# Patient Record
Sex: FEMALE | Race: WHITE | Employment: UNEMPLOYED | URBAN - METROPOLITAN AREA
[De-identification: names, ages, dates, MRNs, and addresses within clinical notes are randomized per-mention and may not be internally consistent; named-entity substitution may affect disease eponyms.]

---

## 2017-01-02 ENCOUNTER — APPOINTMENT (OUTPATIENT)
Dept: GENERAL RADIOLOGY | Age: 32
End: 2017-01-02
Attending: EMERGENCY MEDICINE
Payer: OTHER GOVERNMENT

## 2017-01-02 ENCOUNTER — HOSPITAL ENCOUNTER (EMERGENCY)
Age: 32
Discharge: HOME | End: 2017-01-02
Attending: EMERGENCY MEDICINE
Payer: OTHER GOVERNMENT

## 2017-01-02 VITALS
WEIGHT: 145 LBS | BODY MASS INDEX: 25.69 KG/M2 | HEART RATE: 88 BPM | TEMPERATURE: 97.9 F | HEIGHT: 63 IN | DIASTOLIC BLOOD PRESSURE: 62 MMHG | RESPIRATION RATE: 14 BRPM | SYSTOLIC BLOOD PRESSURE: 106 MMHG | OXYGEN SATURATION: 100 %

## 2017-01-02 DIAGNOSIS — R52 BODY ACHES: ICD-10-CM

## 2017-01-02 DIAGNOSIS — M94.0 COSTOCHONDRITIS: Primary | ICD-10-CM

## 2017-01-02 DIAGNOSIS — J06.9 ACUTE UPPER RESPIRATORY INFECTION: ICD-10-CM

## 2017-01-02 LAB
ANION GAP BLD CALC-SCNC: 7 MMOL/L (ref 3–18)
APPEARANCE UR: NORMAL
BASOPHILS # BLD AUTO: 0 K/UL (ref 0–0.06)
BASOPHILS # BLD: 0 % (ref 0–2)
BILIRUB UR QL: NEGATIVE
BUN SERPL-MCNC: 13 MG/DL (ref 7–18)
BUN/CREAT SERPL: 18 (ref 12–20)
CALCIUM SERPL-MCNC: 9 MG/DL (ref 8.5–10.1)
CHLORIDE SERPL-SCNC: 109 MMOL/L (ref 100–108)
CO2 SERPL-SCNC: 23 MMOL/L (ref 21–32)
COLOR UR: YELLOW
CREAT SERPL-MCNC: 0.74 MG/DL (ref 0.6–1.3)
D DIMER PPP FEU-MCNC: 0.31 UG/ML(FEU)
DIFFERENTIAL METHOD BLD: ABNORMAL
EOSINOPHIL # BLD: 0.2 K/UL (ref 0–0.4)
EOSINOPHIL NFR BLD: 1 % (ref 0–5)
ERYTHROCYTE [DISTWIDTH] IN BLOOD BY AUTOMATED COUNT: 12.6 % (ref 11.6–14.5)
GLUCOSE SERPL-MCNC: 81 MG/DL (ref 74–99)
GLUCOSE UR STRIP.AUTO-MCNC: NEGATIVE MG/DL
HCG UR QL: NEGATIVE
HCT VFR BLD AUTO: 38.4 % (ref 35–45)
HGB BLD-MCNC: 13 G/DL (ref 12–16)
HGB UR QL STRIP: NEGATIVE
KETONES UR QL STRIP.AUTO: NEGATIVE MG/DL
LEUKOCYTE ESTERASE UR QL STRIP.AUTO: NEGATIVE
LYMPHOCYTES # BLD AUTO: 12 % (ref 21–52)
LYMPHOCYTES # BLD: 1.7 K/UL (ref 0.9–3.6)
MAGNESIUM SERPL-MCNC: 2 MG/DL (ref 1.8–2.4)
MCH RBC QN AUTO: 30 PG (ref 24–34)
MCHC RBC AUTO-ENTMCNC: 33.9 G/DL (ref 31–37)
MCV RBC AUTO: 88.5 FL (ref 74–97)
MONOCYTES # BLD: 0.7 K/UL (ref 0.05–1.2)
MONOCYTES NFR BLD AUTO: 5 % (ref 3–10)
NEUTS SEG # BLD: 12 K/UL (ref 1.8–8)
NEUTS SEG NFR BLD AUTO: 82 % (ref 40–73)
NITRITE UR QL STRIP.AUTO: NEGATIVE
PH UR STRIP: 6.5 [PH] (ref 5–8)
PLATELET # BLD AUTO: 314 K/UL (ref 135–420)
PMV BLD AUTO: 9.5 FL (ref 9.2–11.8)
POTASSIUM SERPL-SCNC: 4.6 MMOL/L (ref 3.5–5.5)
PROT UR STRIP-MCNC: NEGATIVE MG/DL
RBC # BLD AUTO: 4.34 M/UL (ref 4.2–5.3)
SODIUM SERPL-SCNC: 139 MMOL/L (ref 136–145)
SP GR UR REFRACTOMETRY: 1 (ref 1–1.03)
TROPONIN I SERPL-MCNC: <0.02 NG/ML (ref 0–0.04)
UROBILINOGEN UR QL STRIP.AUTO: 0.2 EU/DL (ref 0.2–1)
WBC # BLD AUTO: 14.6 K/UL (ref 4.6–13.2)

## 2017-01-02 PROCEDURE — 83735 ASSAY OF MAGNESIUM: CPT

## 2017-01-02 PROCEDURE — 99284 EMERGENCY DEPT VISIT MOD MDM: CPT

## 2017-01-02 PROCEDURE — 74011250637 HC RX REV CODE- 250/637: Performed by: PHYSICIAN ASSISTANT

## 2017-01-02 PROCEDURE — 96374 THER/PROPH/DIAG INJ IV PUSH: CPT

## 2017-01-02 PROCEDURE — 85025 COMPLETE CBC W/AUTO DIFF WBC: CPT

## 2017-01-02 PROCEDURE — 80048 BASIC METABOLIC PNL TOTAL CA: CPT

## 2017-01-02 PROCEDURE — 96375 TX/PRO/DX INJ NEW DRUG ADDON: CPT

## 2017-01-02 PROCEDURE — 74011250636 HC RX REV CODE- 250/636: Performed by: PHYSICIAN ASSISTANT

## 2017-01-02 PROCEDURE — 81003 URINALYSIS AUTO W/O SCOPE: CPT

## 2017-01-02 PROCEDURE — 84484 ASSAY OF TROPONIN QUANT: CPT

## 2017-01-02 PROCEDURE — 85379 FIBRIN DEGRADATION QUANT: CPT

## 2017-01-02 PROCEDURE — 81025 URINE PREGNANCY TEST: CPT

## 2017-01-02 PROCEDURE — 71020 XR CHEST PA LAT: CPT

## 2017-01-02 PROCEDURE — 93005 ELECTROCARDIOGRAM TRACING: CPT

## 2017-01-02 RX ORDER — MORPHINE SULFATE 2 MG/ML
2 INJECTION, SOLUTION INTRAMUSCULAR; INTRAVENOUS ONCE
Status: COMPLETED | OUTPATIENT
Start: 2017-01-02 | End: 2017-01-02

## 2017-01-02 RX ORDER — TRAMADOL HYDROCHLORIDE 50 MG/1
50 TABLET ORAL
Qty: 12 TAB | Refills: 0 | Status: SHIPPED | OUTPATIENT
Start: 2017-01-02 | End: 2017-01-13

## 2017-01-02 RX ORDER — KETOROLAC TROMETHAMINE 30 MG/ML
30 INJECTION, SOLUTION INTRAMUSCULAR; INTRAVENOUS
Status: COMPLETED | OUTPATIENT
Start: 2017-01-02 | End: 2017-01-02

## 2017-01-02 RX ADMIN — PHENOBARBITAL 35 ML: 16.2; .1037; .0065; .0194 ELIXIR ORAL at 14:57

## 2017-01-02 RX ADMIN — MORPHINE SULFATE 2 MG: 2 INJECTION, SOLUTION INTRAMUSCULAR; INTRAVENOUS at 15:50

## 2017-01-02 RX ADMIN — SODIUM CHLORIDE 1000 ML: 900 INJECTION, SOLUTION INTRAVENOUS at 14:56

## 2017-01-02 RX ADMIN — KETOROLAC TROMETHAMINE 30 MG: 30 INJECTION, SOLUTION INTRAMUSCULAR at 14:57

## 2017-01-02 NOTE — ED NOTES
Purposeful rounding completed:    Side rails up x 1:  YES   Bed low and wheels and locked: YES   Call bell in reach: YES   Comfort addressed: YES     Toileting needs addressed: YES   Plan of care reviewed/updated with patient and or family members: YES   IV site assessed: YES  Pain assessed and addressed: [facility]

## 2017-01-02 NOTE — ED PROVIDER NOTES
Patient is a 36 y.o. female presenting with general illness and chest pain. The history is provided by the patient. Generalized Body Aches   This is a new problem. The current episode started 2 days ago. The problem occurs constantly. The problem has been gradually worsening. Associated symptoms include chest pain and shortness of breath. Pertinent negatives include no abdominal pain and no headaches. Nothing aggravates the symptoms. Nothing relieves the symptoms. She has tried acetaminophen for the symptoms. The treatment provided no relief. Chest Pain (Angina)    This is a new problem. The current episode started 2 days ago. The problem has not changed since onset. The problem occurs constantly. The pain is associated with breathing. The pain is present in the substernal region. The pain is at a severity of 7/10. The quality of the pain is described as pressure-like. The pain does not radiate. The symptoms are aggravated by certain positions and deep breathing. Associated symptoms include cough, shortness of breath and weakness. Pertinent negatives include no abdominal pain, no back pain, no claudication, no diaphoresis, no dizziness, no exertional chest pressure, no fever, no headaches, no hemoptysis, no irregular heartbeat, no leg pain, no lower extremity edema, no malaise/fatigue, no nausea, no near-syncope, no numbness, no orthopnea, no palpitations, no PND, no sputum production and no vomiting. She has tried OTC pain medications for the symptoms. The treatment provided no relief. Risk factors include smoking/tobacco exposure. Her past medical history does not include cancer, DM, DVT, HTN, PE or CHF. Past Medical History:   Diagnosis Date    DJD (degenerative joint disease)     Ill-defined condition      DDD       Past Surgical History:   Procedure Laterality Date    Hx hysterectomy           History reviewed. No pertinent family history.     Social History     Social History    Marital status:      Spouse name: N/A    Number of children: N/A    Years of education: N/A     Occupational History    Not on file. Social History Main Topics    Smoking status: Current Every Day Smoker     Packs/day: 1.00    Smokeless tobacco: Not on file    Alcohol use Yes      Comment: socially    Drug use: No    Sexual activity: Yes     Partners: Male     Other Topics Concern    Not on file     Social History Narrative         ALLERGIES: Chantix [varenicline]; Neurontin [gabapentin]; and Topamax [topiramate]    Review of Systems   Constitutional: Negative for chills, diaphoresis, fever and malaise/fatigue. HENT: Negative for ear pain, rhinorrhea and sore throat. Eyes: Negative for pain and redness. Respiratory: Positive for cough and shortness of breath. Negative for hemoptysis, sputum production, wheezing and stridor. Cardiovascular: Positive for chest pain. Negative for palpitations, orthopnea, claudication, leg swelling, PND and near-syncope. Gastrointestinal: Negative for abdominal pain, constipation, diarrhea, nausea and vomiting. Genitourinary: Negative for dysuria. Musculoskeletal: Positive for myalgias. Negative for arthralgias and back pain. Skin: Negative. Neurological: Positive for weakness. Negative for dizziness, light-headedness, numbness and headaches. Psychiatric/Behavioral: Negative. Vitals:    01/02/17 1251   BP: 124/87   Pulse: 88   Resp: 14   Temp: 97.9 °F (36.6 °C)   SpO2: 99%   Weight: 65.8 kg (145 lb)   Height: 5' 3\" (1.6 m)            Physical Exam   Constitutional: She is oriented to person, place, and time. She appears well-developed and well-nourished. No distress. HENT:   Head: Normocephalic and atraumatic.    Right Ear: Tympanic membrane, external ear and ear canal normal.   Left Ear: Tympanic membrane, external ear and ear canal normal.   Nose: Nose normal.   Mouth/Throat: Oropharynx is clear and moist and mucous membranes are normal. No oropharyngeal exudate. Eyes: Conjunctivae and EOM are normal. Pupils are equal, round, and reactive to light. Neck: Normal range of motion. Cardiovascular: Normal rate, regular rhythm, normal heart sounds and intact distal pulses. Exam reveals no gallop and no friction rub. No murmur heard. Pulmonary/Chest: Effort normal and breath sounds normal. No respiratory distress. She has no wheezes. She has no rales. She exhibits tenderness. Abdominal: Soft. Bowel sounds are normal. She exhibits no distension. There is no tenderness. There is no rebound and no guarding. Musculoskeletal: Normal range of motion. She exhibits no edema. Lymphadenopathy:     She has no cervical adenopathy. Neurological: She is alert and oriented to person, place, and time. She displays normal reflexes. No cranial nerve deficit. She exhibits normal muscle tone. Coordination normal.   Skin: Skin is warm. No rash noted. She is not diaphoretic. Psychiatric: She has a normal mood and affect. Her behavior is normal. Judgment and thought content normal.   Nursing note and vitals reviewed. MDM  Number of Diagnoses or Management Options  Acute upper respiratory infection: new and requires workup  Body aches: new and requires workup  Costochondritis: new and requires workup  Diagnosis management comments: DDx:  viral vs bacterial URI, asthma exacerbation, COPD, bronchitis, PNE, PE, allergies, pneumothorax, atypical CP, costochondritis/chest wall pain, HF, MI, TAA/AAA, pericarditis, trauma (cardiac contusion, rib Fx, etc), pleuritic chest pain, pleural effusion, intraabdominal process    EKG NSR without any abnormalities, reviewed CXR myself and with Dr. Maria Ines Arana - no evidence of acute cardiopulmonary process. Demario Amaya PA-C 3:03 PM     UA and electrolytes unremarkable. Pain improved.     Reassessed patient - pain did not improve with GI cocktail -reflux unlikely source of discomfort, also reports to pain improvement with the toradol. Will give one dose of morphine. Homero Joy PA-C 3:22 PM     IMPRESSION AND MEDICAL DECISION MAKING:  Based upon the patients presentation with noted HPI and PE, along with the work up done in the emergency department, I believe that the patient is having non-cardiac chest pain as noted, along with body aches, likely due to URI. Pt non-toxic appearing and stable for discharge to home with symptomatic treatment and close follow up. DIAGNOSIS:  1. Chest wall pain  2. URI  3. Body aches    SPECIFIC PATIENT INSTRUCTIONS FROM THE EMERGENCY PHYSICIAN WHO TREATED YOU TODAY:  1. Continue taking your robaxin and celebrex as prescribed. 2. Take tramadol for pain not controlled with these. 3. Follow up with your primary doctor or your cardiologist (if you have one) in the next 2-4 days for reevaluation. 4. Return to the ED should condition change, worsen, or for any concerns. Pt results have been reviewed with them. They have been counseled regarding diagnosis, treatment, and plan. Pt verbally conveys understanding and agreement of the signs, symptoms, diagnosis, treatment and prognosis and additionally agrees to follow up as discussed. Pt also agrees with the care-plan and conveys that all of their questions have been answered. I have also provided discharge instructions for them that include: educational information regarding their diagnosis and treatment, and list of reasons why they would want to return to the ED prior to their follow-up appointment, should their condition change.    Homero Joy PA-C 3:55 PM        Amount and/or Complexity of Data Reviewed  Clinical lab tests: ordered and reviewed  Tests in the radiology section of CPT®: reviewed and ordered  Tests in the medicine section of CPT®: ordered and reviewed  Discussion of test results with the performing providers: yes  Decide to obtain previous medical records or to obtain history from someone other than the patient: yes  Obtain history from someone other than the patient: yes  Review and summarize past medical records: yes  Discuss the patient with other providers: yes  Independent visualization of images, tracings, or specimens: yes    Risk of Complications, Morbidity, and/or Mortality  Presenting problems: low  Diagnostic procedures: moderate  Management options: low    Patient Progress  Patient progress: stable    ED Course       EKG  Date/Time: 1/2/2017 1:32 PM  Performed by: Chris Banda Authorized by: Chris Banda     ECG reviewed by ED Physician in the absence of a cardiologist: yes    Previous ECG:     Previous ECG:  Unavailable  Interpretation:     Interpretation: normal    Rate:     ECG rate:  75    ECG rate assessment: normal    Rhythm:     Rhythm: sinus rhythm    Ectopy:     Ectopy: none    QRS:     QRS axis:  Normal    QRS intervals:  Normal  Conduction:     Conduction: normal    ST segments:     ST segments:  Normal  T waves:     T waves: normal        Labs Reviewed   CBC WITH AUTOMATED DIFF - Abnormal; Notable for the following:        Result Value    WBC 14.6 (*)     NEUTROPHILS 82 (*)     LYMPHOCYTES 12 (*)     ABS. NEUTROPHILS 12.0 (*)     All other components within normal limits   METABOLIC PANEL, BASIC - Abnormal; Notable for the following:     Chloride 109 (*)     All other components within normal limits   URINALYSIS W/ RFLX MICROSCOPIC   MAGNESIUM   TROPONIN I   HCG URINE, QL   D DIMER     Diagnosis:   1. Costochondritis    2. Body aches    3.  Acute upper respiratory infection          Disposition: home    Follow-up Information     Follow up With Details Comments Contact Info    [facility] EMERGENCY DEPT  As needed, If symptoms worsen [address] [place] [address] Anthony Crespo, DO In 3 days  [address] [place]ber [facility]  [phone]            Patient's Medications   Start Taking    TRAMADOL (ULTRAM) 50 MG TABLET    Take 1 Tab by mouth every six (6) hours as needed for Pain. Max Daily Amount: 200 mg. Continue Taking    CELECOXIB (CELEBREX) 200 MG CAPSULE    Take 200 mg by mouth two (2) times a day. DULOXETINE (CYMBALTA) 20 MG CAPSULE    Take 20 mg by mouth daily. METHOCARBAMOL (ROBAXIN) 500 MG TABLET    Take  by mouth four (4) times daily. These Medications have changed    No medications on file   Stop Taking    ACETAMINOPHEN-CODEINE (TYLENOL-CODEINE #3) 300-30 MG PER TABLET    Take 1-2 tabs po every 4-6 hours prn pain    PHENOL THROAT SPRAY (CHLORASEPTIC THROAT SPRAY) 1.4 % SPRAY    Take 2 Sprays by mouth every three (3) hours as needed for Sore throat. RIZATRIPTAN (MAXALT) 10 MG TABLET    Take 10 mg by mouth once as needed for Migraine. May repeat in 2 hours if needed    ZOLPIDEM (AMBIEN) 5 MG TABLET    Take 1 Tab by mouth nightly as needed for Sleep. Max Daily Amount: 5 mg.

## 2017-01-02 NOTE — ED TRIAGE NOTES
Generalized body aches, chest pain \"feels like someone is sitting on me\" chest hurts when coughing

## 2017-01-02 NOTE — ED NOTES
Patient armband removed and shredded I have reviewed discharge instructions with the patient. The patient verbalized understanding.

## 2017-01-02 NOTE — ED NOTES
Purposeful rounding completed:    Side rails up x 1:  YES   Bed low and wheels and locked: YES   Call bell in reach: YES   Comfort addressed: YES     Toileting needs addressed: YES   Plan of care reviewed/updated with patient and or family members: YES   IV site assessed: YES  Pain assessed and addressed: Kay Spann

## 2017-01-02 NOTE — ED NOTES
Purposeful rounding completed:    Side rails up x 1:  YES   Bed low and wheels and locked: YES   Call bell in reach: YES   Comfort addressed: YES     Toileting needs addressed: YES   Plan of care reviewed/updated with patient and or family members: YES   IV site assessed: YES  Pain assessed and addressed: [facility]

## 2017-01-04 ENCOUNTER — APPOINTMENT (OUTPATIENT)
Dept: PHYSICAL THERAPY | Age: 32
End: 2017-01-04
Payer: OTHER GOVERNMENT

## 2017-01-04 LAB
ATRIAL RATE: 75 BPM
CALCULATED P AXIS, ECG09: 67 DEGREES
CALCULATED R AXIS, ECG10: 46 DEGREES
CALCULATED T AXIS, ECG11: 24 DEGREES
DIAGNOSIS, 93000: NORMAL
P-R INTERVAL, ECG05: 160 MS
Q-T INTERVAL, ECG07: 342 MS
QRS DURATION, ECG06: 80 MS
QTC CALCULATION (BEZET), ECG08: 381 MS
VENTRICULAR RATE, ECG03: 75 BPM

## 2017-01-06 ENCOUNTER — HOSPITAL ENCOUNTER (OUTPATIENT)
Dept: PHYSICAL THERAPY | Age: 32
Discharge: HOME | End: 2017-01-06
Payer: OTHER GOVERNMENT

## 2017-01-06 PROCEDURE — 97140 MANUAL THERAPY 1/> REGIONS: CPT

## 2017-01-06 PROCEDURE — 97014 ELECTRIC STIMULATION THERAPY: CPT

## 2017-01-06 PROCEDURE — 97112 NEUROMUSCULAR REEDUCATION: CPT

## 2017-01-06 NOTE — PROGRESS NOTES
PELVIC FLOOR DAILY TREATMENT NOTE 8-    Patient Name: Abi Gutiérrez  Date:2017  : 1985  [x]  Patient  Verified  Payor:  / Plan: [facility]  ACTIVE DUTY AND DEPENDENTS / Product Type:  /    In time:10:08  Out time:11:08  Total Treatment Time (min): 60    Visit #: 7 of 16    Treatment Area: Pelvic pain [R10.2]    SUBJECTIVE  Pain Level (0-10 scale): 7/10  Any medication changes, allergies to medications, adverse drug reactions, diagnosis change, or new procedure performed?: [] No    [x] Yes (see summary sheet for update)  Subjective functional status/changes:   [] No changes reported  Has strep in bloodstream.  On antibiotic for 48 hrs. HEP 3x day until got sick. Whole body aches. Constipated and sick from antibiotic. Nocturia only once last night. OBJECTIVE  Modality rationale: decrease pain, increase tissue extensibility and neuromuscular reeducation to improve the patients ability to perform household chores and to engage in sex.    Min Type Additional Details   30 [x] Biofeedback x 30 minutes    supine vaginal and with add   15 [x] Estim: []Att   [x]Unatt        []TENS instruct                  [x]IFC  []Premod   []NMES                     []Other:  []w/US   []w/ice   [x]w/heat  Position: supine  Location: lower abdomen    []  Traction: [] Cervical       []Lumbar                       [] Prone          []Supine                       []Intermittent   []Continuous Lbs:  [] before manual  [] after manual    []  Ultrasound: []Continuous   [] Pulsed                           []1MHz   []3MHz Location:  W/cm2:    []  Iontophoresis with dexamethasone         Location: [] Take home patch   [] In clinic    []  Ice     []  heat  []  Ice massage Position:  Location:    []  Vasopneumatic Device Pressure:       [] lo [] med [] hi   Temperature: [] lo [] med [] hi   [x] Skin assessment post-treatment:  [x]intact []redness- no adverse reaction       []redness - adverse reaction: 15 min Manual Therapy: Release/stroking to B obturator internus and L illiococcygeus mm with vaginal stretching. Rationale: Increase tissue extensibility to improve patient's ability to engage in sexual intercourse and undergo a gynecological exam.       min Patient Education: [x] Review HEP    [] Progressed/Changed HEP based on: PF exercises with hip add  [] positioning   [] body mechanics   [] transfers   [] heat/ice application        Other Objective/Functional Measures:    [x]baseline resting tone: 11   [x]slow twitch mms 17.5(7.29) with add   [x]fast twitch mms13.9(2.49)with add    Pain Level (0-10 scale) post treatment: 4    ASSESSMENT/Changes in Function: Patient demonstrates improved strength of pelvic floor muscles with  Accessory muscle use. .      Patient will continue to benefit from skilled PT services to modify and progress therapeutic interventions, address functional mobility deficits, address strength deficits, analyze and address soft tissue restrictions, instruct in home and community integration and address dyspareunia and urinary symptoms to attain remaining goals.      []  See Plan of Care  []  See progress note/recertification  []  See Discharge Summary         Progress towards goals / Updated goals:  STG #1 met    PLAN  [x]  Upgrade activities as tolerated     []  Continue plan of care  []  Update interventions per flow sheet       []  Discharge due to:_  []  Other:_      Mahin Louis PT 1/6/2017  10:12 AM

## 2017-01-06 NOTE — PROGRESS NOTES
[facility] [address], [address], [address] - Phone: [phone]  Fax: [phone]  PROGRESS NOTE  Patient Name: Bri Solo : 1985   Treatment/Medical Diagnosis: Pelvic pain [R10.2]   Referral Source: Dontrell Lyons DO     Date of Initial Visit: 2016 Attended Visits: 7 Missed Visits: 0   SUMMARY OF TREATMENT  PT has consisted of pelvic floor relaxation/strengthening via biofeedback, education as to  pelvic floor anatomy and function/toileting techniques , moist heat, manual therapy and home exercise program.   CURRENT STATUS  Patient has made good progress in PT with short term goals partially met. Functional progress  Includes patient reporting 25% improvement in lower abdominal and vaginal pain with decreased nocturia and   straining for urination and bowel movements. Patient demonstrates improved but still impaired pelvic floor muscle strength. Goal/Measure of Progress Goal Met? 1. Patient performing pelvic floor exercises 3x day   Status at last Eval: na Current Status: 3x yes   2. Patient will decrease score on FOTO/PFDI pain to 47 to indicate improved quality of life. Status at last Eval: 54 Current Status: 25 yes   3. Decrease resting tone of pelvic floor to 6 microvolts to increase ability to engage in sex. 4. Increase net rise of fast twitch contraction to 4 microvolts to  Facilitate voiding. Status at last Eval: 8.0  1.53 Current Status: 9.6   3.6 with accessory muscle use No  progressing   New Goals to be achieved in __4__  weeks:  1. Patient independent in home exercise program.   2.  Patient will report 50% improvement in lower abdominal and vaginal pain with ADLs   3. Increase net rise of fast twitch contraction to 5 microvolts to facilitate decreased straining with urination and bowel movements. .  4.  Patient will report 25% improvement in dyspareunia.      G-Codes: na  RECOMMENDATIONS  Continue pelvic floor PT 2x week for 4 weeks. Recommend home neuromuscular stimulation unit for pelvic floor due to patient slow to strengthen. If agreed please sign and indicate below. If you have any questions/comments please contact us directly at [phone]. Thank you for allowing us to assist in the care of your patient. Therapist Signature: Suzi Prescott PT Date: 1/10/2017     Time: 8:12 AM   NOTE TO PHYSICIAN:  PLEASE COMPLETE THE ORDERS BELOW AND FAX TO   [facility]: [phone]. If you are unable to process this request in 24 hours please contact our office: [phone].    ___ I have read the above report and request that my patient continue as recommended.   ___ I have read the above report and request that my patient continue therapy with the following changes/special instructions:_________________________________________________________   ___ I have read the above report and request that my patient be discharged from therapy.      Physician Signature:        Date:       Time:

## 2017-01-10 ENCOUNTER — HOSPITAL ENCOUNTER (OUTPATIENT)
Dept: PHYSICAL THERAPY | Age: 32
Discharge: HOME | End: 2017-01-10
Payer: OTHER GOVERNMENT

## 2017-01-10 PROCEDURE — 97140 MANUAL THERAPY 1/> REGIONS: CPT

## 2017-01-10 PROCEDURE — 97112 NEUROMUSCULAR REEDUCATION: CPT

## 2017-01-10 PROCEDURE — 97014 ELECTRIC STIMULATION THERAPY: CPT

## 2017-01-10 NOTE — PROGRESS NOTES
PELVIC FLOOR DAILY TREATMENT NOTE 8-    Patient Name: Anup Donnelly  Date:1/10/2017  : 1985  [x]  Patient  Verified  Payor:  / Plan: [facility]  ACTIVE DUTY AND DEPENDENTS / Product Type:  /    In time:10:03  Out time:11:28  Total Treatment Time (min): 85    Visit #: 8 of 16    Treatment Area: Pelvic pain [R10.2]    SUBJECTIVE  Pain Level (0-10 scale): 6  Any medication changes, allergies to medications, adverse drug reactions, diagnosis change, or new procedure performed?: [x] No    [] Yes (see summary sheet for update)  Subjective functional status/changes:   [] No changes reported  Patient reports that she only did HEP 2x since last seen due to daughter having scarlet fever. Prior to this weekend 3x day. Pain is less constant but pressure is always there, 25% improved. Has not engaged in sex. \"The pushing when I have to go is seriously better and I only got up once last night\". Average is 2x night.        OBJECTIVE  Modality rationale: decrease pain and neuromuscular reeducation to improve the patients ability to perform household chores   Min Type Additional Details   30 [x] Biofeedback x 30 minutes    supine vaginal   10 [x] Estim: []Att   [x]Unatt        []TENS instruct                  []IFC  []Premod   [x]NMES  50 Hz 5/10                   []Other:  []w/US   []w/ice   []w/heat  Position: supine  Location: vaginal    []  Traction: [] Cervical       []Lumbar                       [] Prone          []Supine                       []Intermittent   []Continuous Lbs:  [] before manual  [] after manual    []  Ultrasound: []Continuous   [] Pulsed                           []1MHz   []3MHz Location:  W/cm2:    []  Iontophoresis with dexamethasone         Location: [] Take home patch   [] In clinic   15 []  Ice     [x]  heat  []  Ice massage Position: supine  Location: lower abdomen    []  Vasopneumatic Device Pressure:       [] lo [] med [] hi   Temperature: [] lo [] med [] hi [x] Skin assessment post-treatment:  [x]intact []redness- no adverse reaction       []redness - adverse reaction:       30 min Manual Therapy:  Release/stroking to R obturator internus, R illiococcygeus and L pubococcygeus muscles with vaginal stretching   Rationale:  Increase tissue extensibility to improve patient's ability to engage in sexual intercourse and undergo a gynecological exam.            min Patient Education: [x] Review HEP    [] Progressed/Changed HEP based on: Discussed NMES unit for home use. Pt agrees. [] positioning   [] body mechanics   [] transfers   [] heat/ice application        Other Objective/Functional Measures: FOTO pfdi pain decreased to 25. Added NMES to pelvic floor muscles due to patient slow to strengthen. [x]baseline resting tone: 9.6   [x]slow twitch mms 13.1(2.91) with add   [x]fast twitch mms13.5(3.6) with add    Pain Level (0-10 scale) post treatment: 3    ASSESSMENT/Changes in Function: Patient demonstrates improved strength of pelvic floor muscles with accessory muscle use and decreased pain, nocturia and straining. FOTO score indicates improved QOL. Patient will continue to benefit from skilled PT services to modify and progress therapeutic interventions, address functional mobility deficits, address strength deficits, analyze and address soft tissue restrictions, instruct in home and community integration and address dyspareunia and urinary symptoms to attain remaining goals. []  See Plan of Care  [x]  See progress note/recertification  []  See Discharge Summary         Progress towards goals / Updated goals:  STGs 1 and 2 met, #4 progressing, #3 not met.   PLAN  [x]  Upgrade activities as tolerated     []  Continue plan of care  []  Update interventions per flow sheet       []  Discharge due to:_  []  Other:_      Kiera Segovia, PT 1/10/2017  10:04 AM

## 2017-01-13 ENCOUNTER — HOSPITAL ENCOUNTER (EMERGENCY)
Age: 32
Discharge: HOME | End: 2017-01-13
Attending: EMERGENCY MEDICINE
Payer: OTHER GOVERNMENT

## 2017-01-13 ENCOUNTER — HOSPITAL ENCOUNTER (OUTPATIENT)
Dept: PHYSICAL THERAPY | Age: 32
End: 2017-01-13
Payer: OTHER GOVERNMENT

## 2017-01-13 VITALS
HEART RATE: 97 BPM | RESPIRATION RATE: 16 BRPM | OXYGEN SATURATION: 100 % | SYSTOLIC BLOOD PRESSURE: 128 MMHG | DIASTOLIC BLOOD PRESSURE: 93 MMHG | WEIGHT: 150 LBS | BODY MASS INDEX: 26.57 KG/M2 | TEMPERATURE: 98.5 F

## 2017-01-13 DIAGNOSIS — J02.0 ACUTE STREPTOCOCCAL PHARYNGITIS: Primary | ICD-10-CM

## 2017-01-13 LAB
APPEARANCE UR: CLEAR
BACTERIA URNS QL MICRO: NEGATIVE /HPF
BILIRUB UR QL: NEGATIVE
COLOR UR: YELLOW
EPITH CASTS URNS QL MICRO: NORMAL /LPF (ref 0–5)
GLUCOSE UR STRIP.AUTO-MCNC: NEGATIVE MG/DL
HCG UR QL: NEGATIVE
HETEROPH AB SER QL: NEGATIVE
HGB UR QL STRIP: NEGATIVE
KETONES UR QL STRIP.AUTO: NEGATIVE MG/DL
LEUKOCYTE ESTERASE UR QL STRIP.AUTO: ABNORMAL
NITRITE UR QL STRIP.AUTO: NEGATIVE
PH UR STRIP: 7 [PH] (ref 5–8)
PROT UR STRIP-MCNC: NEGATIVE MG/DL
RBC #/AREA URNS HPF: 0 /HPF (ref 0–5)
SP GR UR REFRACTOMETRY: 1.02 (ref 1–1.03)
UROBILINOGEN UR QL STRIP.AUTO: 0.2 EU/DL (ref 0.2–1)
WBC URNS QL MICRO: NORMAL /HPF (ref 0–4)

## 2017-01-13 PROCEDURE — 81025 URINE PREGNANCY TEST: CPT

## 2017-01-13 PROCEDURE — 96374 THER/PROPH/DIAG INJ IV PUSH: CPT

## 2017-01-13 PROCEDURE — 74011250636 HC RX REV CODE- 250/636: Performed by: PHYSICIAN ASSISTANT

## 2017-01-13 PROCEDURE — 86308 HETEROPHILE ANTIBODY SCREEN: CPT

## 2017-01-13 PROCEDURE — 87081 CULTURE SCREEN ONLY: CPT

## 2017-01-13 PROCEDURE — 96361 HYDRATE IV INFUSION ADD-ON: CPT

## 2017-01-13 PROCEDURE — 81001 URINALYSIS AUTO W/SCOPE: CPT

## 2017-01-13 PROCEDURE — 99283 EMERGENCY DEPT VISIT LOW MDM: CPT

## 2017-01-13 RX ORDER — RIZATRIPTAN BENZOATE 10 MG/1
10 TABLET ORAL
COMMUNITY

## 2017-01-13 RX ORDER — HYDROMORPHONE HYDROCHLORIDE 1 MG/ML
2 INJECTION, SOLUTION INTRAMUSCULAR; INTRAVENOUS; SUBCUTANEOUS
Status: COMPLETED | OUTPATIENT
Start: 2017-01-13 | End: 2017-01-13

## 2017-01-13 RX ORDER — PREDNISONE 20 MG/1
20 TABLET ORAL DAILY
Qty: 5 TAB | Refills: 0 | Status: SHIPPED | OUTPATIENT
Start: 2017-01-13 | End: 2017-01-18

## 2017-01-13 RX ORDER — HYDROCODONE BITARTRATE AND ACETAMINOPHEN 5; 325 MG/1; MG/1
1 TABLET ORAL
Qty: 12 TAB | Refills: 0 | Status: SHIPPED | OUTPATIENT
Start: 2017-01-13 | End: 2017-03-09

## 2017-01-13 RX ORDER — AMOXICILLIN AND CLAVULANATE POTASSIUM 875; 125 MG/1; MG/1
1 TABLET, FILM COATED ORAL 2 TIMES DAILY
Qty: 20 TAB | Refills: 0 | Status: SHIPPED | OUTPATIENT
Start: 2017-01-13 | End: 2017-01-23

## 2017-01-13 RX ADMIN — SODIUM CHLORIDE 1000 ML: 900 INJECTION, SOLUTION INTRAVENOUS at 13:54

## 2017-01-13 RX ADMIN — HYDROMORPHONE HYDROCHLORIDE 2 MG: 1 INJECTION, SOLUTION INTRAMUSCULAR; INTRAVENOUS; SUBCUTANEOUS at 13:54

## 2017-01-13 NOTE — DISCHARGE INSTRUCTIONS
Strep Throat: Care Instructions  Your Care Instructions    Strep throat is a bacterial infection that causes sudden, severe sore throat and fever. Strep throat, which is caused by bacteria called streptococcus, is treated with antibiotics. Sometimes a strep test is necessary to tell if the sore throat is caused by strep bacteria. Treatment can help ease symptoms and may prevent future problems. Follow-up care is a key part of your treatment and safety. Be sure to make and go to all appointments, and call your doctor if you are having problems. It's also a good idea to know your test results and keep a list of the medicines you take. How can you care for yourself at home? · Take your antibiotics as directed. Do not stop taking them just because you feel better. You need to take the full course of antibiotics. · Strep throat can spread to others until 24 hours after you begin taking antibiotics. During this time, you should avoid contact with other people at work or home, especially infants and children. Do not sneeze or cough on others, and wash your hands often. Keep your drinking glass and eating utensils separate from those of others, and wash these items well in hot, soapy water. · Gargle with warm salt water at least once each hour to help reduce swelling and make your throat feel better. Use 1 teaspoon of salt mixed in 8 fluid ounces of warm water. · Take an over-the-counter pain medication, such as acetaminophen (Tylenol), ibuprofen (Advil, Motrin), or naproxen (Aleve). Read and follow all instructions on the label. · Try an over-the-counter anesthetic throat spray or throat lozenges, which may help relieve throat pain. · Drink plenty of fluids. Fluids may help soothe an irritated throat. Hot fluids, such as tea or soup, may help your throat feel better. · Eat soft solids and drink plenty of clear liquids.  Flavored ice pops, ice cream, scrambled eggs, sherbet, and gelatin dessert (such as Jell-O) may also soothe the throat. · Get lots of rest.  · Do not smoke, and avoid secondhand smoke. If you need help quitting, talk to your doctor about stop-smoking programs and medicines. These can increase your chances of quitting for good. · Use a vaporizer or humidifier to add moisture to the air in your bedroom. Follow the directions for cleaning the machine. When should you call for help? Call your doctor now or seek immediate medical care if:  · You have a new or higher fever. · You have a fever with a stiff neck or severe headache. · You have new or worse trouble swallowing. · Your sore throat gets much worse on one side. · Your pain becomes much worse on one side of your throat. Watch closely for changes in your health, and be sure to contact your doctor if:  · You are not getting better after 2 days (48 hours). · You do not get better as expected. Where can you learn more? Go to http://arthur-erika.info/. Enter K625 in the search box to learn more about \"Strep Throat: Care Instructions. \"  Current as of: July 29, 2016  Content Version: 11.1  © 6651-5268 [employer]. Care instructions adapted under license by [employer] (which disclaims liability or warranty for this information). If you have questions about a medical condition or this instruction, always ask your healthcare professional. [address] any warranty or liability for your use of this information. Sore Throat: Care Instructions  Your Care Instructions    Infection by bacteria or a virus causes most sore throats. Cigarette smoke, dry air, air pollution, allergies, and yelling can also cause a sore throat. Sore throats can be painful and annoying. Fortunately, most sore throats go away on their own. If you have a bacterial infection, your doctor may prescribe antibiotics. Follow-up care is a key part of your treatment and safety.  Be sure to make and go to all appointments, and call your doctor if you are having problems. It's also a good idea to know your test results and keep a list of the medicines you take. How can you care for yourself at home? · If your doctor prescribed antibiotics, take them as directed. Do not stop taking them just because you feel better. You need to take the full course of antibiotics. · Gargle with warm salt water once an hour to help reduce swelling and relieve discomfort. Use 1 teaspoon of salt mixed in 1 cup of warm water. · Take an over-the-counter pain medicine, such as acetaminophen (Tylenol), ibuprofen (Advil, Motrin), or naproxen (Aleve). Read and follow all instructions on the label. · Be careful when taking over-the-counter cold or flu medicines and Tylenol at the same time. Many of these medicines have acetaminophen, which is Tylenol. Read the labels to make sure that you are not taking more than the recommended dose. Too much acetaminophen (Tylenol) can be harmful. · Drink plenty of fluids. Fluids may help soothe an irritated throat. Hot fluids, such as tea or soup, may help decrease throat pain. · Use over-the-counter throat lozenges to soothe pain. Regular cough drops or hard candy may also help. These should not be given to young children because of the risk of choking. · Do not smoke or allow others to smoke around you. If you need help quitting, talk to your doctor about stop-smoking programs and medicines. These can increase your chances of quitting for good. · Use a vaporizer or humidifier to add moisture to your bedroom. Follow the directions for cleaning the machine. When should you call for help? Call your doctor now or seek immediate medical care if:  · You have new or worse trouble swallowing. · Your sore throat gets much worse on one side. Watch closely for changes in your health, and be sure to contact your doctor if you do not get better as expected. Where can you learn more?   Go to http://arthur-erika.info/. Enter 062 [id number] in the search box to learn more about \"Sore Throat: Care Instructions. \"  Current as of: July 29, 2016  Content Version: 11.1  © 5726-8044 [employer]. Care instructions adapted under license by [employer] (which disclaims liability or warranty for this information). If you have questions about a medical condition or this instruction, always ask your healthcare professional. [place] [address] any warranty or liability for your use of this information.

## 2017-01-13 NOTE — ED TRIAGE NOTES
Seen 2 weeks ago and told had virus. Saw PCP told positive for strep.  Placed on penicillin, not feeling better, body aches, sore throat feels swollen

## 2017-01-13 NOTE — ED PROVIDER NOTES
HPI Comments: 12:36 PM Bri Solo is a 36 y.o. female with a history of DJD who presents to the emergency department c/o sore throat and generalized body aches onset ~ 3 weeks ago. The patient presented to the ED ~ 3 weeks ago and was diagnosed with a viral infection. Pt f/u with her PCP the next day and was diagnosed with strep throat; was given penicillin to treat. Pt took full course of antibiotics with no relief. Pt reports \"if anything I feel worse\". Pt has taken only the medication prescribed with no relief. Pt states she felt \"flushed and red\" after taking antibiotics. No other concerns at this time. PCP: Lenny Cisneros DO      The history is provided by the patient. Past Medical History:   Diagnosis Date    DJD (degenerative joint disease)     Ill-defined condition      DDD       Past Surgical History:   Procedure Laterality Date    Hx hysterectomy           History reviewed. No pertinent family history. Social History     Social History    Marital status:      Spouse name: N/A    Number of children: N/A    Years of education: N/A     Occupational History    Not on file. Social History Main Topics    Smoking status: Current Every Day Smoker     Packs/day: 1.00    Smokeless tobacco: Not on file    Alcohol use Yes      Comment: socially    Drug use: No    Sexual activity: Yes     Partners: Male     Other Topics Concern    Not on file     Social History Narrative         ALLERGIES: Chantix [varenicline]; Neurontin [gabapentin]; and Topamax [topiramate]    Review of Systems   Constitutional: Positive for chills. Negative for diaphoresis and fever. HENT: Positive for sore throat. Negative for ear pain, rhinorrhea and trouble swallowing. Eyes: Negative for pain, redness and visual disturbance. Respiratory: Negative for cough and shortness of breath. Cardiovascular: Negative for chest pain and leg swelling.    Gastrointestinal: Negative for abdominal pain, blood in stool, constipation, diarrhea, nausea and vomiting. Genitourinary: Negative for dysuria, flank pain and hematuria. Musculoskeletal: Positive for myalgias. Negative for arthralgias, back pain, gait problem, joint swelling, neck pain and neck stiffness.        (+) Generalized body aches    Skin: Negative. Negative for rash. Neurological: Positive for headaches. Negative for dizziness, tremors, seizures, speech difficulty, weakness, light-headedness and numbness. Hematological: Negative. Negative for adenopathy. Psychiatric/Behavioral: Negative. All other systems reviewed and are negative. Vitals:    01/13/17 1102   BP: (!) 128/93   Pulse: 97   Resp: 16   Temp: 98.5 °F (36.9 °C)   SpO2: 100%   Weight: 68 kg (150 lb)            Physical Exam   Constitutional: She is oriented to person, place, and time. She appears well-developed and well-nourished. Non-toxic appearance. She has a sickly appearance. She does not appear ill. No distress. HENT:   Head: Normocephalic and atraumatic. Right Ear: Hearing, tympanic membrane, external ear and ear canal normal.   Left Ear: Hearing, tympanic membrane, external ear and ear canal normal.   Nose: Nose normal.   Mouth/Throat: Uvula is midline and mucous membranes are normal. Oropharyngeal exudate, posterior oropharyngeal edema and posterior oropharyngeal erythema present. No tonsillar abscesses. Eyes: Conjunctivae and EOM are normal. Pupils are equal, round, and reactive to light. Right eye exhibits no discharge. Left eye exhibits no discharge. Neck: Normal range of motion. Cardiovascular: Normal rate, regular rhythm and normal heart sounds. Pulmonary/Chest: Effort normal and breath sounds normal. No accessory muscle usage. No respiratory distress. She has no decreased breath sounds. She has no wheezes. She has no rhonchi. She has no rales. Abdominal: Soft. Normal appearance and bowel sounds are normal. She exhibits no distension. There is no tenderness. There is no rigidity, no rebound, no guarding, no CVA tenderness, no tenderness at McBurney's point and negative Lao's sign. Musculoskeletal: Normal range of motion. She exhibits no edema or tenderness. Lymphadenopathy:     She has no cervical adenopathy. Neurological: She is alert and oriented to person, place, and time. She has normal reflexes. Skin: Skin is warm and dry. She is not diaphoretic. Psychiatric: She has a normal mood and affect. Her behavior is normal. Judgment and thought content normal.   Nursing note and vitals reviewed. MDM  Number of Diagnoses or Management Options  Acute streptococcal pharyngitis:   Diagnosis management comments: DDx: pharyngitis, tonsillitis, laryngitis, URI, strep, mono, PTA, Sherman's angina, reactive lymphadenopathy, sialadenitis, bronchoconstrictions     ED Course:  Pt strep positive, mono negative. Given this, as well as the patients presentation, I believe that the patient has acute strep tonsillopharyngitis. Pt failed PCN tx. Will give augmentin. MDM:  There is no airway compromise. No stridor. Patient is safe for discharge home. DIAGNOSIS:  1. Acute strep pharyngitis. SPECIFIC PATIENT INSTRUCTIONS FROM THE PHYSICIAN WHO TREATED YOU IN THE ER TODAY:  1. Return if any concerns or worsening of condition(s)  2. Augmentin and prednisone as prescribed until finished. 3.  Use over the counter Chloraseptic and throat lozenges to help control the throat pain. Take norco as needed as prescribed for pain and fever. 4.  Follow up with your primary doctor if not improving in the next 2-3 days. Pt results have been reviewed with them. They have been counseled regarding diagnosis, treatment, and plan. Pt verbally conveys understanding and agreement of the signs, symptoms, diagnosis, treatment and prognosis and additionally agrees to follow up as discussed.  Pt also agrees with the care-plan and conveys that all of their questions have been answered. I have also provided discharge instructions for them that include: educational information regarding their diagnosis and treatment, and list of reasons why they would want to return to the ED prior to their follow-up appointment, should their condition change. She Loera PA-C 2:58 PM            Amount and/or Complexity of Data Reviewed  Clinical lab tests: ordered and reviewed  Tests in the radiology section of CPT®: ordered and reviewed  Tests in the medicine section of CPT®: ordered and reviewed  Discussion of test results with the performing providers: yes  Decide to obtain previous medical records or to obtain history from someone other than the patient: yes  Obtain history from someone other than the patient: yes  Review and summarize past medical records: yes  Discuss the patient with other providers: yes  Independent visualization of images, tracings, or specimens: yes    Risk of Complications, Morbidity, and/or Mortality  Presenting problems: low  Diagnostic procedures: low  Management options: low    Patient Progress  Patient progress: stable    ED Course       Procedures    Vitals:  Patient Vitals for the past 12 hrs:   Temp Pulse Resp BP SpO2   01/13/17 1102 98.5 °F (36.9 °C) 97 16 (!) 128/93 100 %   100%. Percentage is within normal limits. Medications ordered:   Medications - No data to display      Progress notes, Consult notes or Re-evaluation:   Discussed all results with pt and pt agrees with plan for discharge. Pt is to follow up with PCP. All questions answered at this time. ED warnings given for any new or worsening symptoms. Pt voices understanding. Pt discharged in stable condition. Diagnosis:   1.  Acute streptococcal pharyngitis          Disposition: home    Follow-up Information     Follow up With Details Comments Contact [facility] EMERGENCY DEPT  As needed, If symptoms worsen [address] [place] [address] Olga Todd In 3 days  [address]  [address]  [phone]            Patient's Medications   Start Taking    AMOXICILLIN-CLAVULANATE (AUGMENTIN) 875-125 MG PER TABLET    Take 1 Tab by mouth two (2) times a day for 10 days. HYDROCODONE-ACETAMINOPHEN (NORCO) 5-325 MG PER TABLET    Take 1 Tab by mouth every four (4) hours as needed for Pain. Max Daily Amount: 6 Tabs. PREDNISONE (DELTASONE) 20 MG TABLET    Take 1 Tab by mouth daily for 5 days. With Breakfast   Continue Taking    CELECOXIB (CELEBREX) 200 MG CAPSULE    Take 200 mg by mouth two (2) times a day. DULOXETINE (CYMBALTA) 20 MG CAPSULE    Take 20 mg by mouth daily. METHOCARBAMOL (ROBAXIN) 500 MG TABLET    Take  by mouth four (4) times daily. RIZATRIPTAN (MAXALT) 10 MG TABLET    Take 10 mg by mouth once as needed for Migraine. May repeat in 2 hours if needed   These Medications have changed    No medications on file   Stop Taking    TRAMADOL (ULTRAM) 50 MG TABLET    Take 1 Tab by mouth every six (6) hours as needed for Pain. Max Daily Amount: 200 mg.       Scribe Attestation:     I, Juanjo Ratliff, scribing for and in the presence of Mary Zurita January 13, 2017 at 1:04 PM     Signed by: Kaiden De La Rosa, January 13, 2017 at 1:04 PM     Physician Attestation:   I personally performed the services described in this documentation, reviewed and edited the documentation which was dictated to the scribe in my presence, and it accurately records my words and actions.  Mary Zurita  January 13, 2017 at 1:04 PM

## 2017-01-13 NOTE — ED NOTES
I have reviewed discharge instructions with the patient. The patient verbalized understanding. Patient made aware that she should not drive while taking prescription narcotics. Patient verbalized understanding. Patient armband removed and shredded.

## 2017-01-14 LAB
B-HEM STREP THROAT QL CULT: POSITIVE
BACTERIA SPEC CULT: NORMAL
SERVICE CMNT-IMP: NORMAL

## 2017-01-17 ENCOUNTER — HOSPITAL ENCOUNTER (OUTPATIENT)
Dept: PHYSICAL THERAPY | Age: 32
Discharge: HOME | End: 2017-01-17
Payer: OTHER GOVERNMENT

## 2017-01-17 PROCEDURE — 97014 ELECTRIC STIMULATION THERAPY: CPT

## 2017-01-17 PROCEDURE — 97140 MANUAL THERAPY 1/> REGIONS: CPT

## 2017-01-17 PROCEDURE — 97112 NEUROMUSCULAR REEDUCATION: CPT

## 2017-01-17 NOTE — PROGRESS NOTES
PELVIC FLOOR DAILY TREATMENT NOTE 8-    Patient Name: Ayde Man  Date:2017  : 1985  [x]  Patient  Verified  Payor: [facility] / Plan: [employer] [address] AND DEPENDENTS / Product Type: Juanita Like /    In time:10:10  Out time:10:58  Total Treatment Time (min): 48    Visit #: 9 of 16    Treatment Area: Pelvic pain [R10.2]    SUBJECTIVE  Pain Level (0-10 scale): 4  Any medication changes, allergies to medications, adverse drug reactions, diagnosis change, or new procedure performed?: [] No    [x] Yes (see summary sheet for update)  Subjective functional status/changes:   [] No changes reported  Had a super bug but feeling better now. Was in ER for a day 2017    OBJECTIVE  Modality rationale: decrease pain and neuromuscular reeducation to improve the patients ability to perform household chores    Min Type Additional Details   23 [x] Biofeedback x 23 minutes    supine vaginal with add   10 [] Estim: []Att   [x]Unatt        []TENS instruct                  []IFC  []Premod   [x]NMES 5/10 50 Hz                    []Other:  []w/US   []w/ice   []w/heat  Position: supine  Location: vaginal    []  Traction: [] Cervical       []Lumbar                       [] Prone          []Supine                       []Intermittent   []Continuous Lbs:  [] before manual  [] after manual    []  Ultrasound: []Continuous   [] Pulsed                           []1MHz   []3MHz Location:  W/cm2:    []  Iontophoresis with dexamethasone         Location: [] Take home patch   [] In clinic    []  Ice     []  heat  []  Ice massage Position:  Location:    []  Vasopneumatic Device Pressure:       [] lo [] med [] hi   Temperature: [] lo [] med [] hi   [x] Skin assessment post-treatment:  [x]intact []redness- no adverse reaction       []redness - adverse reaction:       15 min Manual Therapy:  Release/stroking/stretching to bilateral pubococcygeus and L obturator internus   Rationale: Increase tissue extensibility to improve patient's ability to engage in sexual intercourse and undergo a gynecological exam.             min Patient Education: [x] Review HEP    [] Progressed/Changed HEP based on: Consider attempting intercourse  [] positioning   [] body mechanics   [] transfers   [] heat/ice application        Other Objective/Functional Measures:    [x]baseline resting tone: 9.7   [x]slow twitch mms 18.6(7.58) with add   [x]fast twitch mms21.8(6.44)    Pain Level (0-10 scale) post treatment: 4    ASSESSMENT/Changes in Function: Patient demonstrates improved strength of pelvic floor muscles with accessory muscle use  And decreased pain. Patient will continue to benefit from skilled PT services to modify and progress therapeutic interventions, address functional mobility deficits, address strength deficits, analyze and address soft tissue restrictions, analyze and modify body mechanics/ergonomics, instruct in home and community integration and address dyspareunia and UI to attain remaining goals.      []  See Plan of Care  []  See progress note/recertification  []  See Discharge Summary         Progress towards goals / Updated goals:  LTG #3 met    PLAN  [x]  Upgrade activities as tolerated     []  Continue plan of care  []  Update interventions per flow sheet       []  Discharge due to:_  []  Other:_      Kaushal Epps PT 1/17/2017  10:14 AM

## 2017-01-20 ENCOUNTER — HOSPITAL ENCOUNTER (OUTPATIENT)
Dept: PHYSICAL THERAPY | Age: 32
Discharge: HOME | End: 2017-01-20
Payer: OTHER GOVERNMENT

## 2017-01-20 PROCEDURE — 97014 ELECTRIC STIMULATION THERAPY: CPT

## 2017-01-20 PROCEDURE — 97110 THERAPEUTIC EXERCISES: CPT

## 2017-01-20 PROCEDURE — 97140 MANUAL THERAPY 1/> REGIONS: CPT

## 2017-01-20 NOTE — PROGRESS NOTES
PELVIC FLOOR DAILY TREATMENT NOTE 8-14    Patient Name: Abi Gutiérrez  Date:2017  : 1985  [x]  Patient  Verified  Payor:  / Plan: [facility]  ACTIVE DUTY AND DEPENDENTS / Product Type:  /    In time:10:05  Out time:10:55  Total Treatment Time (min): 50  Visit #: 10 of 16    Treatment Area: Pelvic pain [R10.2]    SUBJECTIVE  Pain Level (0-10 scale): 4  Any medication changes, allergies to medications, adverse drug reactions, diagnosis change, or new procedure performed?: [x] No    [] Yes (see summary sheet for update)  Subjective functional status/changes:   [] No changes reported  Feeling pain in R lower abdomen. Get's sharp pain there if lifts child, vacuums or bend down to wash dog. Happens 3-4 x day.     OBJECTIVE  Modality rationale: decrease pain and neuromuscular reeducation to improve the patients ability to perform ADLs and to decrease urinary symptoms   Min Type Additional Details   na [] Biofeedback x na minutes    na   10 [x] Estim: []Att   [x]Unatt        []TENS instruct                  []IFC  []Premod   [x]NMES 5/10, 50 HZ                    []Other:  []w/US   [x]w/ice R psoas   []w/heat  Position: supine  Location: vaginal    []  Traction: [] Cervical       []Lumbar                       [] Prone          []Supine                       []Intermittent   []Continuous Lbs:  [] before manual  [] after manual    []  Ultrasound: []Continuous   [] Pulsed                           []1MHz   []3MHz Location:  W/cm2:    []  Iontophoresis with dexamethasone         Location: [] Take home patch   [] In clinic    []  Ice     []  heat  []  Ice massage Position:  Location:    []  Vasopneumatic Device Pressure:       [] lo [] med [] hi   Temperature: [] lo [] med [] hi   [x] Skin assessment post-treatment:  [x]intact []redness- no adverse reaction       []redness - adverse reaction:     10 min Therapeutic Exercise:  [x] See flow sheet :  []  Pelvic floor strengthening []  Pelvic floor downtraining  []  Quality pelvic floor contractions      []  Relaxation techniques  []  Urge suppression exercises  [x]  Other: Psoas inhibition    Rationale: Decrease spasm and pain  to improve the patients ability to perform household chores and lift child      30 min Manual Therapy:  Release to R illiopsoas at ASIS. Release/stroking to bilateral pubococcygeus. Rationale: Increase tissue extensibility to improve patient's ability to engage in sexual intercourse and to perform ADLs             min Patient Education: [x] Review HEP    [] Progressed/Changed HEP based on: Add psoas inhibition  [] positioning   [] body mechanics   [] transfers   [] heat/ice application          Pain Level (0-10 scale) post treatment: 5-6    ASSESSMENT/Changes in Function: Severe muscle tenderness R illiopsoas. Patient will continue to benefit from skilled PT services to modify and progress therapeutic interventions, address functional mobility deficits, address strength deficits, analyze and address soft tissue restrictions, analyze and modify body mechanics/ergonomics, instruct in home and community integration and address dyspareunia and urinary symptoms to attain remaining goals.      []  See Plan of Care  []  See progress note/recertification  []  See Discharge Summary         Progress towards goals / Updated goals:  Progressing toward all LTGs    PLAN  [x]  Upgrade activities as tolerated     []  Continue plan of care  []  Update interventions per flow sheet       []  Discharge due to:_  []  Other:_      Yesenia Nassar, PT 1/20/2017  10:13 AM

## 2017-01-24 ENCOUNTER — APPOINTMENT (OUTPATIENT)
Dept: PHYSICAL THERAPY | Age: 32
End: 2017-01-24
Payer: OTHER GOVERNMENT

## 2017-01-27 ENCOUNTER — HOSPITAL ENCOUNTER (OUTPATIENT)
Dept: PHYSICAL THERAPY | Age: 32
Discharge: HOME | End: 2017-01-27
Payer: OTHER GOVERNMENT

## 2017-01-27 PROCEDURE — 97014 ELECTRIC STIMULATION THERAPY: CPT

## 2017-01-27 PROCEDURE — 97112 NEUROMUSCULAR REEDUCATION: CPT

## 2017-01-27 PROCEDURE — 97140 MANUAL THERAPY 1/> REGIONS: CPT

## 2017-01-27 NOTE — PROGRESS NOTES
PELVIC FLOOR DAILY TREATMENT NOTE 8-14    Patient Name: Joe Infante  Date:2017  : 1985  [x]  Patient  Verified  Payor: [facility] / Plan: [employer] [facility] [address] AND DEPENDENTS / Product Type: Alma Matisa /    In time:10:03  Out time:10:50  Total Treatment Time (min): 52    Visit #: 11 of 16    Treatment Area: Pelvic pain [R10.2]    SUBJECTIVE  Pain Level (0-10 scale): 6  Any medication changes, allergies to medications, adverse drug reactions, diagnosis change, or new procedure performed?: [] No    [x] Yes (see summary sheet for update)  Subjective functional status/changes:   [] No changes reported  Saw Dr. Padilla Jameson who referred her to pain management. Had 15 injections  Into lower abdomen, groin and near sit bones. Advised continue PT including scar massage. Not straining with bowel movements or urination except for yesterday. OBJECTIVE  Modality rationale: Neuromuscular reeducation to improve the patients ability to engage in sex and perform household chores.    Min Type Additional Details   27 [x] Biofeedback x 27 minutes    supine vaginal   10 [x] Estim: []Att   []Unatt        []TENS instruct                  []IFC  []Premod   [x]NMES  50 Hz 5/10                   []Other:  []w/US   []w/ice   []w/heat  Position: supine  Location: vaginal    []  Traction: [] Cervical       []Lumbar                       [] Prone          []Supine                       []Intermittent   []Continuous Lbs:  [] before manual  [] after manual    []  Ultrasound: []Continuous   [] Pulsed                           []1MHz   []3MHz Location:  W/cm2:    []  Iontophoresis with dexamethasone         Location: [] Take home patch   [] In clinic    []  Ice     []  heat  []  Ice massage Position:  Location:    []  Vasopneumatic Device Pressure:       [] lo [] med [] hi   Temperature: [] lo [] med [] hi   [x] Skin assessment post-treatment:  [x]intact []redness- no adverse reaction       []redness - adverse reaction: 10 min Manual Therapy: Release/stroking to bilateral illiococcygeus, R pubococcygeus and R obturator internus mm. Rationale: Increase tissue extensibility to improve patient's ability to engage in sexual intercourse and undergo a gynecological exam.            min Patient Education: [x] Review HEP    [] Progressed/Changed HEP based on:   Continue present HEP. Reviewed not straining with urination or bowel movements. [] positioning   [] body mechanics   [] transfers   [] heat/ice application        Other Objective/Functional Measures:    [x]baseline resting tone: 8.7   [x]slow twitch mms 12.8(4.58) with add   [x]fast twitch mms18.4(6.94) with add    Pain Level (0-10 scale) post treatment: 6    ASSESSMENT/Changes in Function: Patient demonstrates decreased tenderness in L pelvic floor muscles following injections but continued impaired pelvic floor muscle strength. .      Patient will continue to benefit from skilled PT services to modify and progress therapeutic interventions, address functional mobility deficits, address strength deficits, analyze and address soft tissue restrictions, analyze and modify body mechanics/ergonomics, instruct in home and community integration and address urinary symptoms and dyspareunia. to attain remaining goals.      []  See Plan of Care  []  See progress note/recertification  []  See Discharge Summary         Progress towards goals / Updated goals:  LTG #3 met    PLAN  [x]  Upgrade activities as tolerated     []  Continue plan of care  []  Update interventions per flow sheet       []  Discharge due to:_  []  Other:_      Micheal Ocasio, PT 1/27/2017  10:06 AM

## 2017-01-31 ENCOUNTER — HOSPITAL ENCOUNTER (OUTPATIENT)
Dept: PHYSICAL THERAPY | Age: 32
Discharge: HOME | End: 2017-01-31
Payer: OTHER GOVERNMENT

## 2017-01-31 PROCEDURE — 97110 THERAPEUTIC EXERCISES: CPT

## 2017-01-31 PROCEDURE — 97140 MANUAL THERAPY 1/> REGIONS: CPT

## 2017-01-31 PROCEDURE — 97014 ELECTRIC STIMULATION THERAPY: CPT

## 2017-01-31 NOTE — PROGRESS NOTES
PELVIC FLOOR DAILY TREATMENT NOTE 8-14    Patient Name: Abi Gutiérrez  Date:2017  : 1985  [x]  Patient  Verified  Payor:  / Plan: [facility]  ACTIVE DUTY AND DEPENDENTS / Product Type:  /    In time:10:47  Out time:11:37  Total Treatment Time (min): 50    Visit #: 12 of 16    Treatment Area: Pelvic pain [R10.2]    SUBJECTIVE  Pain Level (0-10 scale): 5  Any medication changes, allergies to medications, adverse drug reactions, diagnosis change, or new procedure performed?: [] No    [x] Yes (see summary sheet for update)  Subjective functional status/changes:   [] No changes reported  Feeling weak today. Having night sweats and hot flashes due to Depron injection. Pain in R lower abdomen. Doing psoas HEP 1x day.     OBJECTIVE  Modality rationale: Neuromuscular reeducation to improve the patients ability to engage in sex   Min Type Additional Details   na [] Biofeedback x na minutes    na   10 [x] Estim: []Att   [x]Unatt        []TENS instruct                  []IFC  []Premod   [x]NMES                     []Other:  []w/US   []w/ice   []w/heat  Position: supine  Location: vaginal    []  Traction: [] Cervical       []Lumbar                       [] Prone          []Supine                       []Intermittent   []Continuous Lbs:  [] before manual  [] after manual    []  Ultrasound: []Continuous   [] Pulsed                           []1MHz   []3MHz Location:  W/cm2:    []  Iontophoresis with dexamethasone         Location: [] Take home patch   [] In clinic    []  Ice     []  heat  []  Ice massage Position:  Location:    []  Vasopneumatic Device Pressure:       [] lo [] med [] hi   Temperature: [] lo [] med [] hi   [x] Skin assessment post-treatment:  [x]intact []redness- no adverse reaction       []redness - adverse reaction:     15 min Therapeutic Exercise:  [] See flow sheet :  []  Pelvic floor strengthening                []  Pelvic floor downtraining  []  Quality pelvic floor contractions      []  Relaxation techniques  []  Urge suppression exercises  [x]  Other: Psoas inhibition    Rationale: increase strength and increase flexibility to improve the patients ability to do household chores. 25 min Manual Therapy:   Release/DTM R illiopsas, C section scar mobs, MFR abdomen    Rationale:  Increase tissue extensibility to improve patient's ability to engage in sexual intercourse and undergo a gynecological exam.            min Patient Education: [x] Review HEP    [] Progressed/Changed HEP based on: Add TB exercise  [] positioning   [] body mechanics   [] transfers   [] heat/ice application          Pain Level (0-10 scale) post treatment: 4    ASSESSMENT/Changes in Function: Continued tenderness over R illiopsoas. Patient will continue to benefit from skilled PT services to modify and progress therapeutic interventions, address functional mobility deficits, address strength deficits, analyze and address soft tissue restrictions, analyze and modify body mechanics/ergonomics, instruct in home and community integration and address urinary symptoms and dyspareunia to attain remaining goals.      []  See Plan of Care  []  See progress note/recertification  []  See Discharge Summary         Progress towards goals / Updated goals:  Progressing towards LTGs 1 and 2    PLAN  [x]  Upgrade activities as tolerated     []  Continue plan of care  []  Update interventions per flow sheet       []  Discharge due to:_  []  Other:_      Thao Horan, PT 1/31/2017  10:53 AM

## 2017-02-03 ENCOUNTER — HOSPITAL ENCOUNTER (OUTPATIENT)
Dept: PHYSICAL THERAPY | Age: 32
Discharge: HOME | End: 2017-02-03
Payer: OTHER GOVERNMENT

## 2017-02-03 PROCEDURE — 97112 NEUROMUSCULAR REEDUCATION: CPT

## 2017-02-03 PROCEDURE — 97140 MANUAL THERAPY 1/> REGIONS: CPT

## 2017-02-03 PROCEDURE — 97014 ELECTRIC STIMULATION THERAPY: CPT

## 2017-02-03 NOTE — PROGRESS NOTES
PELVIC FLOOR DAILY TREATMENT NOTE 8-    Patient Name: Slim Hicks  Date:2/3/2017  : 1985  [x]  Patient  Verified  Payor:  / Plan: [facility]  ACTIVE DUTY AND DEPENDENTS / Product Type:  /    In time:10:09  Out time:10:53  Total Treatment Time (min): 44    Visit #: 13 of 16    Treatment Area: Pelvic pain [R10.2]    SUBJECTIVE  Pain Level (0-10 scale): 6  Any medication changes, allergies to medications, adverse drug reactions, diagnosis change, or new procedure performed?: [] No    [x] Yes (see summary sheet for update)  Subjective functional status/changes:   [] No changes reported  Had injections yesterday. More lower abdominal pain.     OBJECTIVE  Modality rationale: Neuromuscular reeducation to improve the patients ability to urinate and have bowel movement   Min Type Additional Details   19 [x] Biofeedback x 19 minutes    supine vaginal with add   10 [x] Estim: []Att   [x]Unatt        []TENS instruct                  []IFC  []Premod   [x]NMES 50 Hz 5/10                    []Other:  []w/US   []w/ice   []w/heat  Position: supine  Location: vaginal    []  Traction: [] Cervical       []Lumbar                       [] Prone          []Supine                       []Intermittent   []Continuous Lbs:  [] before manual  [] after manual    []  Ultrasound: []Continuous   [] Pulsed                           []1MHz   []3MHz Location:  W/cm2:    []  Iontophoresis with dexamethasone         Location: [] Take home patch   [] In clinic    []  Ice     []  heat  []  Ice massage Position:  Location:    []  Vasopneumatic Device Pressure:       [] lo [] med [] hi   Temperature: [] lo [] med [] hi   [x] Skin assessment post-treatment:  [x]intact []redness- no adverse reaction       []redness - adverse reaction:       15 min Manual Therapy:   Release/stroking to bilateral obturator internus and illiococcygeus mm, L pubococcygeus muscle     Rationale:  Increase tissue extensibility to improve patient's ability to engage in sexual intercourse and undergo a gynecological exam.          min Patient Education: [x] Review HEP    [] Progressed/Changed HEP based on:   [] positioning   [] body mechanics   [] transfers   [] heat/ice application        Other Objective/Functional Measures:    [x]baseline resting tone: 12.2   [x]slow twitch mms 18.2(6.76)   [x]fast twitch mms18(3.9)    Pain Level (0-10 scale) post treatment: 6    ASSESSMENT/Changes in Function: Patient demonstrates improving but still impaired net rise of pelvic floor contractions with continued pelvic floor muscle tenderness. Patient will continue to benefit from skilled PT services to modify and progress therapeutic interventions, address functional mobility deficits, address strength deficits, analyze and address soft tissue restrictions, analyze and modify body mechanics/ergonomics, instruct in home and community integration and address urinary symptoms and dyspareunia to attain remaining goals.      []  See Plan of Care  []  See progress note/recertification  []  See Discharge Summary         Progress towards goals / Updated goals:  Slow progress towards LTGs    PLAN  [x]  Upgrade activities as tolerated     []  Continue plan of care  []  Update interventions per flow sheet       []  Discharge due to:_  []  Other:_      Rigoberto Ledesma PT 2/3/2017  10:11 AM

## 2017-02-07 ENCOUNTER — HOSPITAL ENCOUNTER (OUTPATIENT)
Dept: PHYSICAL THERAPY | Age: 32
Discharge: HOME | End: 2017-02-07
Payer: OTHER GOVERNMENT

## 2017-02-07 PROCEDURE — 97140 MANUAL THERAPY 1/> REGIONS: CPT

## 2017-02-07 PROCEDURE — 97110 THERAPEUTIC EXERCISES: CPT

## 2017-02-07 NOTE — PROGRESS NOTES
PELVIC FLOOR DAILY TREATMENT NOTE 8-    Patient Name: Alex Posadas  Date:2017  : 1985  [x]  Patient  Verified  Payor:  / Plan: [employer] [facility] [address] AND DEPENDENTS / Product Type:  /    In time:10:06  Out time:11:05  Total Treatment Time (min): 61    Visit #: 14 of 16    Treatment Area: Pelvic pain [R10.2]    SUBJECTIVE  Pain Level (0-10 scale): 6-7/10  Any medication changes, allergies to medications, adverse drug reactions, diagnosis change, or new procedure performed?: [x] No    [] Yes (see summary sheet for update)  Subjective functional status/changes:   [] No changes reported  Patient reports that she is in a lot of pain in R groin.     OBJECTIVE  Modality rationale: decrease pain to improve the patients ability to do household chores   Min Type Additional Details   na [] Biofeedback x na minutes    na    [] Estim: []Att   []Unatt        []TENS instruct                  []IFC  []Premod   []NMES                     []Other:  []w/US   []w/ice   []w/heat  Position:  Location:    []  Traction: [] Cervical       []Lumbar                       [] Prone          []Supine                       []Intermittent   []Continuous Lbs:  [] before manual  [] after manual    []  Ultrasound: []Continuous   [] Pulsed                           []1MHz   []3MHz Location:  W/cm2:    []  Iontophoresis with dexamethasone         Location: [] Take home patch   [] In clinic   10 [x]  Ice     []  heat  []  Ice massage Position: supine  Location: R groin    []  Vasopneumatic Device Pressure:       [] lo [] med [] hi   Temperature: [] lo [] med [] hi   [x] Skin assessment post-treatment:  [x]intact []redness- no adverse reaction       []redness - adverse reaction:     23 min Therapeutic Exercise:  [x] See flow sheet :  []  Pelvic floor strengthening                []  Pelvic floor downtraining  []  Quality pelvic floor contractions      []  Relaxation techniques  []  Urge suppression exercises  [x]  Other:  Psoas inhibition, core strengthening    Rationale: increase strength and and flexibility to improve the patients ability to perform household chores      26 min Manual Therapy: Release/DTM R illiopsas, C section scar mobs, MFR abdomen    Rationale:  Increase tissue extensibility to improve patient's ability to engage in sexual intercourse and undergo a gynecological exam.              min Patient Education: [x] Review HEP    [] Progressed/Changed HEP based on: Add hip add/abd, PPT  [] positioning   [] body mechanics   [] transfers   [] heat/ice application          Pain Level (0-10 scale) post treatment: 4    ASSESSMENT/Changes in Function: Continued L illiopsoas tenderness but able to advance core strengthening today. Patient will continue to benefit from skilled PT services to modify and progress therapeutic interventions, address functional mobility deficits, address strength deficits, analyze and address soft tissue restrictions, analyze and modify body mechanics/ergonomics, instruct in home and community integration and address urinary symptoms and dyspareunia to attain remaining goals.      []  See Plan of Care  []  See progress note/recertification  []  See Discharge Summary         Progress towards goals / Updated goals:  Slow progress towards LTGs    PLAN  [x]  Upgrade activities as tolerated     []  Continue plan of care  []  Update interventions per flow sheet       []  Discharge due to:_  []  Other:_      Alok Johnson, PT 2/7/2017  10:08 AM

## 2017-02-10 ENCOUNTER — HOSPITAL ENCOUNTER (OUTPATIENT)
Dept: PHYSICAL THERAPY | Age: 32
Discharge: HOME | End: 2017-02-10
Payer: OTHER GOVERNMENT

## 2017-02-10 PROCEDURE — 97032 APPL MODALITY 1+ESTIM EA 15: CPT

## 2017-02-10 NOTE — PROGRESS NOTES
PELVIC FLOOR DAILY TREATMENT NOTE 8-14    Patient Name: Anselmo Wall  Date:2/10/2017  : 1985  [x]  Patient  Verified  [facility]  ACTIVE DUTY AND DEPENDENTS / Product Type:  /    In time:11:15  Out time:11:42  Total Treatment Time (min): 32    Visit #: 15 of 18    Treatment Area: Pelvic pain [R10.2]    SUBJECTIVE  Pain Level (0-10 scale): 6  Any medication changes, allergies to medications, adverse drug reactions, diagnosis change, or new procedure performed?: [x] No    [] Yes (see summary sheet for update)  Subjective functional status/changes:   [] No changes reported  Eager to use home unit. Sneezed and leaked. OBJECTIVE  Modality rationale: Neuromuscular reeducation to improve the patients urinary incontinence   Min Type Additional Details   na [] Biofeedback x na minutes    na   32 [] Estim: []Att   []Unatt        []TENS instruct                  []IFC  []Premod   [x]NMES  instruction                   []Other:  []w/US   []w/ice   []w/heat  Position: supine  Location: vaginal    []  Traction: [] Cervical       []Lumbar                       [] Prone          []Supine                       []Intermittent   []Continuous Lbs:  [] before manual  [] after manual    []  Ultrasound: []Continuous   [] Pulsed                           []1MHz   []3MHz Location:  W/cm2:    []  Iontophoresis with dexamethasone         Location: [] Take home patch   [] In clinic    []  Ice     []  heat  []  Ice massage Position:  Location:    []  Vasopneumatic Device Pressure:       [] lo [] med [] hi   Temperature: [] lo [] med [] hi   [x] Skin assessment post-treatment:  [x]intact []redness- no adverse reaction       []redness - adverse reaction:              min Patient Education: [x] Review HEP    [] Progressed/Changed HEP based on:   Home NMES instruction.     [] positioning   [] body mechanics   [] transfers   [] heat/ice application          Pain Level (0-10 scale) post treatment: 6    ASSESSMENT/Changes in Function: Patient demonstrated good understanding of use of home NMES unit    Patient will continue to benefit from skilled PT services to modify and progress therapeutic interventions, address functional mobility deficits, address strength deficits, analyze and address soft tissue restrictions, analyze and modify body mechanics/ergonomics, instruct in home and community integration and address UI, dyspareunia to attain remaining goals.      []  See Plan of Care  [x]  See progress note/recertification  []  See Discharge Summary         Progress towards goals / Updated goals:  Slow progress towards LTGs    PLAN  [x]  Upgrade activities as tolerated     []  Continue plan of care  []  Update interventions per flow sheet       []  Discharge due to:_  []  Other:_      Yesenia Nassar, PT 2/10/2017  11:19 AM

## 2017-02-10 NOTE — PROGRESS NOTES
Denies PHYSICAL THERAPY AT [facility] [address], [address] [facility] [address] - Phone: [phone]  Fax: [phone]  PROGRESS NOTE  Patient Name: Uriel Moreno : 1985   Treatment/Medical Diagnosis: Pelvic pain [R10.2]   Referral Source: Suly Balderrama DO     Date of Initial Visit: 2016 Attended Visits: 15 Missed Visits: 1   SUMMARY OF TREATMENT  PT has consisted of pelvic floor relaxation/strengthening via biofeedback, education as to  pelvic floor anatomy and function/toileting techniques , moist heat, manual therapy, NMES and home exercise program.   CURRENT STATUS  Patient has made slow progress in PT. Patient issued home pelvic floor NMES unit secondary to slow to strengthen. Goal/Measure of Progress Goal Met? 1. Patient independent in home exercise program   Status at last Eval: progressing Current Status: progressing progressing   2. Patient will report 50% improvement in lower abdominal and vaginal pain with ADLs. Status at last Eval: na Current Status: Not assessed Not reassesed   3. Increase net rise of fast twitch contraction to 5 microvolts to facilitate decreased straining with urination and bowel movements. .  4. Patient will report 25% improvement in dyspareunia   Status at last Eval: 3.6 with accessory muscle  na Current Status: 3.9 with accessory muscle  Not reassessed No  Not reassessed   New Goals to be achieved in __3__  treatments:  1. Patient independent in home exercise program and use of home NMES unit. .   2.  Patient will report 50% improvement in lower abdominal and vaginal pain with ADLs. 3.  Increase net rise of fast twitch contraction to 5 microvolts to facilitate decreased straining with urination and bowel movements. .  4. Patient will report 25% improvement in dyspareunia   G-Codes: na  RECOMMENDATIONS  Continue pelvic floor PT another 3 treatments.    If you have any questions/comments please contact us directly at [phone]. Thank you for allowing us to assist in the care of your patient. Therapist Signature: Brain Parikh, PT Date: 2/10/2017     Time: 11:54 AM   NOTE TO PHYSICIAN:  PLEASE COMPLETE THE ORDERS BELOW AND FAX TO   [facility]: [phone]. If you are unable to process this request in 24 hours please contact our office: [phone].    ___ I have read the above report and request that my patient continue as recommended.   ___ I have read the above report and request that my patient continue therapy with the following changes/special instructions:_________________________________________________________   ___ I have read the above report and request that my patient be discharged from therapy.      Physician Signature:        Date:       Time:

## 2017-02-14 ENCOUNTER — APPOINTMENT (OUTPATIENT)
Dept: PHYSICAL THERAPY | Age: 32
End: 2017-02-14
Payer: OTHER GOVERNMENT

## 2017-02-17 ENCOUNTER — APPOINTMENT (OUTPATIENT)
Dept: PHYSICAL THERAPY | Age: 32
End: 2017-02-17
Payer: OTHER GOVERNMENT

## 2017-02-21 ENCOUNTER — HOSPITAL ENCOUNTER (OUTPATIENT)
Dept: PHYSICAL THERAPY | Age: 32
Discharge: HOME | End: 2017-02-21
Payer: OTHER GOVERNMENT

## 2017-02-21 PROCEDURE — 97112 NEUROMUSCULAR REEDUCATION: CPT

## 2017-02-21 PROCEDURE — 97140 MANUAL THERAPY 1/> REGIONS: CPT

## 2017-02-21 NOTE — PROGRESS NOTES
PELVIC FLOOR DAILY TREATMENT NOTE 8-14    Patient Name: Martha Vallecillo  Date:2017  : 1985  [x]  Patient  Verified  Payor:  / Plan: [employer] [facility] [address] AND DEPENDENTS / Product Type:  /    In time:10:15  Out time:10:46  Total Treatment Time (min): 31    Visit #: 16 of 18    Treatment Area: Pelvic pain [R10.2]    SUBJECTIVE  Pain Level (0-10 scale): 0  Any medication changes, allergies to medications, adverse drug reactions, diagnosis change, or new procedure performed?: [x] No    [] Yes (see summary sheet for update)  Subjective functional status/changes:   [] No changes reported  \"I've been doing really well. I used the machine once since I got it\"  Patient reports walking outdoors more which has made her feel better. Feels like vaginal muscles are looser. Engaged in sex which was better but still painful. \"Not awesome like it used to be\"    OBJECTIVE  Modality rationale: Neuromuscular reeducation to improve the patients ability to engage in sex and decrease urinary frequency.    Min Type Additional Details   15 [x] Biofeedback x 15 minutes    supine vaginal    [] Estim: []Att   []Unatt        []TENS instruct                  []IFC  []Premod   []NMES                     []Other:  []w/US   []w/ice   []w/heat  Position:  Location:    []  Traction: [] Cervical       []Lumbar                       [] Prone          []Supine                       []Intermittent   []Continuous Lbs:  [] before manual  [] after manual    []  Ultrasound: []Continuous   [] Pulsed                           []1MHz   []3MHz Location:  W/cm2:    []  Iontophoresis with dexamethasone         Location: [] Take home patch   [] In clinic    []  Ice     []  heat  []  Ice massage Position:  Location:    []  Vasopneumatic Device Pressure:       [] lo [] med [] hi   Temperature: [] lo [] med [] hi   [x] Skin assessment post-treatment:  [x]intact []redness- no adverse reaction       []redness - adverse reaction:     16 min Manual Therapy:   Release/stroking to bilateral pubococcygeus mm with vaginal stretching   Rationale:  Increase tissue extensibility to improve patient's ability to engage in sexual intercourse. min Patient Education: [x] Review HEP    [] Progressed/Changed HEP based on: Pt encouraged to use NMES 3-6x weekly. [] positioning   [] body mechanics   [] transfers   [] heat/ice application        Other Objective/Functional Measures:    [x]baseline resting tone: -   [x]slow twitch mms 7.62(1.95) without add   [x]fast twitch mms 8.36(- .55)without add  Pain Level (0-10 scale) post treatment: 0    ASSESSMENT/Changes in Function: Patient demonstrates continued impaired strength of pelvic floor muscles but decreased resting tone and muscular tenderness with decreased dyspareunia. Patient will continue to benefit from skilled PT services to modify and progress therapeutic interventions, address functional mobility deficits, address strength deficits, analyze and address soft tissue restrictions, analyze and modify body mechanics/ergonomics, instruct in home and community integration and address dyspareunia and urinary symptoms to attain remaining goals. []  See Plan of Care  []  See progress note/recertification  []  See Discharge Summary         Progress towards goals / Updated goals:  Progressing slowly towards all LTGs.     PLAN  [x]  Upgrade activities as tolerated     []  Continue plan of care  []  Update interventions per flow sheet       []  Discharge due to:_  []  Other:_      Laurie Milian, PT 2/21/2017  10:16 AM

## 2017-03-09 ENCOUNTER — HOSPITAL ENCOUNTER (EMERGENCY)
Age: 32
Discharge: HOME | End: 2017-03-09
Attending: EMERGENCY MEDICINE | Admitting: EMERGENCY MEDICINE
Payer: OTHER GOVERNMENT

## 2017-03-09 VITALS
RESPIRATION RATE: 17 BRPM | SYSTOLIC BLOOD PRESSURE: 145 MMHG | WEIGHT: 150 LBS | OXYGEN SATURATION: 100 % | TEMPERATURE: 97.7 F | BODY MASS INDEX: 26.57 KG/M2 | DIASTOLIC BLOOD PRESSURE: 97 MMHG | HEART RATE: 98 BPM

## 2017-03-09 DIAGNOSIS — J02.0 ACUTE STREPTOCOCCAL PHARYNGITIS: ICD-10-CM

## 2017-03-09 PROCEDURE — 99282 EMERGENCY DEPT VISIT SF MDM: CPT

## 2017-03-09 RX ORDER — CLINDAMYCIN HYDROCHLORIDE 300 MG/1
300 CAPSULE ORAL 4 TIMES DAILY
Qty: 40 CAP | Refills: 0 | Status: SHIPPED | OUTPATIENT
Start: 2017-03-09 | End: 2017-03-19

## 2017-03-09 RX ORDER — IBUPROFEN 400 MG/1
800 TABLET ORAL
Status: DISCONTINUED | OUTPATIENT
Start: 2017-03-09 | End: 2017-03-09 | Stop reason: CLARIF

## 2017-03-09 RX ORDER — ACETAMINOPHEN AND CODEINE PHOSPHATE 300; 30 MG/1; MG/1
1 TABLET ORAL
Qty: 6 TAB | Refills: 0 | Status: SHIPPED | OUTPATIENT
Start: 2017-03-09

## 2017-03-09 NOTE — ED PROVIDER NOTES
HPI Comments: Hx of frequent strep infections, last treated in January with Augmentin. Has apt with PCP for ENT referral.  Past Medical History:  No date: DJD (degenerative joint disease)  No date: Ill-defined condition      Comment: DDD     Patient is a 36 y.o. female presenting with sore throat. The history is provided by the patient. Sore Throat    This is a new problem. The current episode started yesterday. The problem has not changed since onset. There has been a fever of 100 - 100.9 F. The fever has been present for less than 1 day. Associated symptoms include swollen glands. Pertinent negatives include no diarrhea, no vomiting, no congestion, no drooling, no ear discharge, no ear pain, no headaches, no plugged ear sensation, no shortness of breath, no stridor, no trouble swallowing, no stiff neck and no cough. She has had exposure to strep. She has tried acetaminophen for the symptoms. The treatment provided no relief. Past Medical History:   Diagnosis Date    DJD (degenerative joint disease)     Ill-defined condition     DDD       Past Surgical History:   Procedure Laterality Date    HX HYSTERECTOMY           History reviewed. No pertinent family history. Social History     Social History    Marital status:      Spouse name: N/A    Number of children: N/A    Years of education: N/A     Occupational History    Not on file. Social History Main Topics    Smoking status: Current Every Day Smoker     Packs/day: 1.00    Smokeless tobacco: Not on file    Alcohol use Yes      Comment: socially    Drug use: No    Sexual activity: Yes     Partners: Male     Other Topics Concern    Not on file     Social History Narrative         ALLERGIES: Chantix [varenicline]; Neurontin [gabapentin]; and Topamax [topiramate]    Review of Systems   Constitutional: Positive for chills and fever. HENT: Positive for sore throat.  Negative for congestion, drooling, ear discharge, ear pain and trouble swallowing. Respiratory: Negative for cough, shortness of breath and stridor. Gastrointestinal: Negative for diarrhea and vomiting. Musculoskeletal: Positive for myalgias. Negative for neck pain. Neurological: Negative for headaches. Vitals:    03/09/17 1010   BP: (!) 145/97   Pulse: 98   Resp: 17   Temp: 97.7 °F (36.5 °C)   SpO2: 100%   Weight: 68 kg (150 lb)            Physical Exam   Constitutional: She is oriented to person, place, and time. She appears well-developed and well-nourished. No distress. HENT:   Head: Normocephalic and atraumatic. Right Ear: External ear normal.   Left Ear: External ear normal.   Nose: Nose normal.   Mouth/Throat: Oropharyngeal exudate present. +erythematous bilateral tonsils, no swelling, +exudated. No concern for peritonsillar or retropharyngeal abscess. No ludwigs angina   Eyes: Conjunctivae are normal.   Neck: Normal range of motion. Neck supple. No tracheal deviation present. Cardiovascular: Normal rate and regular rhythm. Pulmonary/Chest: Effort normal. No stridor. No respiratory distress. She has no wheezes. She exhibits no tenderness. Abdominal: Soft. She exhibits no distension. There is no tenderness. Musculoskeletal: Normal range of motion. Lymphadenopathy:     She has cervical adenopathy. Neurological: She is alert and oriented to person, place, and time. No cranial nerve deficit. Coordination normal.   Skin: Skin is warm and dry. No rash noted. She is not diaphoretic. Psychiatric: She has a normal mood and affect. Nursing note and vitals reviewed.        MDM  Number of Diagnoses or Management Options  Acute streptococcal pharyngitis:   Elevated blood pressure:   Diagnosis management comments: Centor Criteria applied- cervical lymphadenopathy, no cough, +fever, +exudative tonsils  No airway compromise, no stridor, no concern for abscess -  stable for d/c home  Impression: strep pharyngitis  Rx for clindamycin as patient states she has been on Augmentin and PCN in past 4 months. Rx for FEW tylenol 3 for pain     BP reading elevated while in ED with no previous or mentioned hx. Pt is made aware and will f/u with PCP. She understands risks of prolonged elevated BP including end organ disease.     Mary Alamo PA-C 10:47 AM         ED Course       Procedures

## 2017-03-09 NOTE — LETTER
[facility] - [address] EMERGENCY DEPT 
[phone] 
[phone] Work/School Note Date: 3/9/2017 To Whom It May concern: 
 
Clyde Lorenzo was seen and treated today in the emergency room by the following provider(s): 
Attending Provider: Raj Leon MD 
Physician Assistant: Shonda Reveles PA-C. Clyde Lorenzo can return to work 3/13/17 Sincerely, Shonda Reveles PA-C

## 2017-03-09 NOTE — DISCHARGE INSTRUCTIONS

## 2017-03-09 NOTE — ED NOTES
I have reviewed discharge instructions with the patient. The patient verbalized understanding. Patient armband removed and shredded. Patient refused ibuprofen, stated it was contraindicated with the celebrex she takes.

## 2017-03-30 NOTE — PROGRESS NOTES
[address] [address]  Phone: [phone]  Fax: [phone] SUMMARY  Patient Name: Bri Solo : 1985   Treatment/Medical Diagnosis: Pelvic pain [R10.2]   Referral Source: Dontrell Lyons DO     Date of Initial Visit: 2016 Attended Visits: 16 Missed Visits: 3     SUMMARY OF TREATMENT   PT has consisted of pelvic floor relaxation/strengthening via biofeedback, education as to  pelvic floor anatomy and function/toileting techniques , moist heat, manual therapy, NMES and home exercise program  CURRENT STATUS  Patient completed 16 of 18 treatments then did not return to PT. Unable to do reassessment. RECOMMENDATIONS  Discontinue therapy due to lack of attendance or compliance. .  Patient to continue on home exercise program.  If you have any questions/comments please contact us directly at [phone]. Thank you for allowing us to assist in the care of your patient. Therapist Signature:  Kelsi Jara PT Date: 3/30/2016     Time: 1:12 PM

## 2018-06-25 ENCOUNTER — HOSPITAL ENCOUNTER (EMERGENCY)
Age: 33
Discharge: HOME | End: 2018-06-25
Attending: EMERGENCY MEDICINE
Payer: OTHER GOVERNMENT

## 2018-06-25 VITALS
OXYGEN SATURATION: 99 % | DIASTOLIC BLOOD PRESSURE: 62 MMHG | HEART RATE: 96 BPM | RESPIRATION RATE: 16 BRPM | TEMPERATURE: 97.7 F | SYSTOLIC BLOOD PRESSURE: 128 MMHG

## 2018-06-25 DIAGNOSIS — M79.18 BUTTOCK PAIN: ICD-10-CM

## 2018-06-25 DIAGNOSIS — L02.31 ABSCESS OF BUTTOCK, LEFT: Primary | ICD-10-CM

## 2018-06-25 LAB
BASOPHILS # BLD: 0 K/UL (ref 0–0.06)
BASOPHILS NFR BLD: 0 % (ref 0–2)
DIFFERENTIAL METHOD BLD: ABNORMAL
EOSINOPHIL # BLD: 0.2 K/UL (ref 0–0.4)
EOSINOPHIL NFR BLD: 2 % (ref 0–5)
ERYTHROCYTE [DISTWIDTH] IN BLOOD BY AUTOMATED COUNT: 12.8 % (ref 11.6–14.5)
HCT VFR BLD AUTO: 35.6 % (ref 35–45)
HGB BLD-MCNC: 12.3 G/DL (ref 12–16)
LYMPHOCYTES # BLD: 2.3 K/UL (ref 0.9–3.6)
LYMPHOCYTES NFR BLD: 21 % (ref 21–52)
MCH RBC QN AUTO: 28.1 PG (ref 24–34)
MCHC RBC AUTO-ENTMCNC: 34.6 G/DL (ref 31–37)
MCV RBC AUTO: 81.5 FL (ref 74–97)
MONOCYTES # BLD: 0.6 K/UL (ref 0.05–1.2)
MONOCYTES NFR BLD: 5 % (ref 3–10)
NEUTS SEG # BLD: 8 K/UL (ref 1.8–8)
NEUTS SEG NFR BLD: 72 % (ref 40–73)
PLATELET # BLD AUTO: 333 K/UL (ref 135–420)
PMV BLD AUTO: 9.1 FL (ref 9.2–11.8)
RBC # BLD AUTO: 4.37 M/UL (ref 4.2–5.3)
WBC # BLD AUTO: 11.2 K/UL (ref 4.6–13.2)

## 2018-06-25 PROCEDURE — 87076 CULTURE ANAEROBE IDENT EACH: CPT | Performed by: NURSE PRACTITIONER

## 2018-06-25 PROCEDURE — 96372 THER/PROPH/DIAG INJ SC/IM: CPT

## 2018-06-25 PROCEDURE — 90715 TDAP VACCINE 7 YRS/> IM: CPT | Performed by: NURSE PRACTITIONER

## 2018-06-25 PROCEDURE — 74011250636 HC RX REV CODE- 250/636: Performed by: NURSE PRACTITIONER

## 2018-06-25 PROCEDURE — 87040 BLOOD CULTURE FOR BACTERIA: CPT | Performed by: NURSE PRACTITIONER

## 2018-06-25 PROCEDURE — 74011000250 HC RX REV CODE- 250: Performed by: NURSE PRACTITIONER

## 2018-06-25 PROCEDURE — 87070 CULTURE OTHR SPECIMN AEROBIC: CPT | Performed by: NURSE PRACTITIONER

## 2018-06-25 PROCEDURE — 90471 IMMUNIZATION ADMIN: CPT

## 2018-06-25 PROCEDURE — 99282 EMERGENCY DEPT VISIT SF MDM: CPT

## 2018-06-25 PROCEDURE — 85025 COMPLETE CBC W/AUTO DIFF WBC: CPT | Performed by: NURSE PRACTITIONER

## 2018-06-25 PROCEDURE — 87185 SC STD ENZYME DETCJ PER NZM: CPT | Performed by: NURSE PRACTITIONER

## 2018-06-25 PROCEDURE — 75810000289 HC I&D ABSCESS SIMP/COMP/MULT

## 2018-06-25 RX ORDER — HYDROMORPHONE HYDROCHLORIDE 1 MG/ML
2 INJECTION, SOLUTION INTRAMUSCULAR; INTRAVENOUS; SUBCUTANEOUS
Status: COMPLETED | OUTPATIENT
Start: 2018-06-25 | End: 2018-06-25

## 2018-06-25 RX ORDER — CEPHALEXIN 500 MG/1
500 CAPSULE ORAL 4 TIMES DAILY
Qty: 40 CAP | Refills: 0 | Status: SHIPPED | OUTPATIENT
Start: 2018-06-25 | End: 2018-07-05

## 2018-06-25 RX ORDER — HYDROMORPHONE HYDROCHLORIDE 1 MG/ML
2 INJECTION, SOLUTION INTRAMUSCULAR; INTRAVENOUS; SUBCUTANEOUS
Status: DISCONTINUED | OUTPATIENT
Start: 2018-06-25 | End: 2018-06-25

## 2018-06-25 RX ORDER — HYDROCODONE BITARTRATE AND ACETAMINOPHEN 5; 325 MG/1; MG/1
1 TABLET ORAL
Qty: 20 TAB | Refills: 0 | Status: SHIPPED | OUTPATIENT
Start: 2018-06-25

## 2018-06-25 RX ORDER — HYDROMORPHONE HYDROCHLORIDE 2 MG/ML
2 INJECTION, SOLUTION INTRAMUSCULAR; INTRAVENOUS; SUBCUTANEOUS
Status: DISCONTINUED | OUTPATIENT
Start: 2018-06-25 | End: 2018-06-25

## 2018-06-25 RX ORDER — IBUPROFEN 800 MG/1
800 TABLET ORAL
Qty: 20 TAB | Refills: 0 | Status: SHIPPED | OUTPATIENT
Start: 2018-06-25 | End: 2018-07-02

## 2018-06-25 RX ORDER — SULFAMETHOXAZOLE AND TRIMETHOPRIM 800; 160 MG/1; MG/1
1 TABLET ORAL 2 TIMES DAILY
Qty: 20 TAB | Refills: 0 | Status: SHIPPED | OUTPATIENT
Start: 2018-06-25

## 2018-06-25 RX ORDER — LIDOCAINE HYDROCHLORIDE 10 MG/ML
20 INJECTION INFILTRATION; PERINEURAL ONCE
Status: COMPLETED | OUTPATIENT
Start: 2018-06-25 | End: 2018-06-25

## 2018-06-25 RX ADMIN — Medication 2 MG: at 11:45

## 2018-06-25 RX ADMIN — TETANUS TOXOID, REDUCED DIPHTHERIA TOXOID AND ACELLULAR PERTUSSIS VACCINE, ADSORBED 0.5 ML: 5; 2.5; 8; 8; 2.5 SUSPENSION INTRAMUSCULAR at 13:05

## 2018-06-25 RX ADMIN — LIDOCAINE HYDROCHLORIDE 20 ML: 10 INJECTION, SOLUTION INFILTRATION; PERINEURAL at 12:30

## 2018-06-25 NOTE — ED PROVIDER NOTES
HPI Comments: Elissa Savage is as 34year old female with a abscess formation on the left buttock. Pt states it has been there for several weeks. She went to her [facility] PCP for evaluation and was placed on ABX, but it didn't help. Nothing has made it better or worse. Patient is a 34 y.o. female presenting with abscess. The history is provided by the patient. History limited by: [place] communication barrier. Abscess    This is a new problem. Episode onset: TWo weeks ago. The problem has been gradually worsening. There has been no fever. Affected Location: right buttock. The pain is moderate. The pain has been constant since onset. Treatments tried: Pt was placed on ABX, but they have not helped. Past Medical History:   Diagnosis Date    DJD (degenerative joint disease)     Ill-defined condition     DDD       Past Surgical History:   Procedure Laterality Date    HX HYSTERECTOMY           No family history on file. Social History     Social History    Marital status:      Spouse name: N/A    Number of children: N/A    Years of education: N/A     Occupational History    Not on file. Social History Main Topics    Smoking status: Current Every Day Smoker     Packs/day: 1.00    Smokeless tobacco: Not on file    Alcohol use Yes      Comment: socially    Drug use: No    Sexual activity: Yes     Partners: Male     Other Topics Concern    Not on file     Social History Narrative         ALLERGIES: Chantix [varenicline]; Neurontin [gabapentin]; and Topamax [topiramate]    Review of Systems   Constitutional: Negative. HENT: Negative. Eyes: Negative. Respiratory: Negative. Cardiovascular: Negative. Gastrointestinal: Negative. Endocrine: Negative. Genitourinary: Negative. Musculoskeletal: Negative. Skin: Positive for wound (buttock abscess). Allergic/Immunologic: Negative. Neurological: Negative. Hematological: Negative. Psychiatric/Behavioral: Negative. Vitals:    06/25/18 1113   BP: 128/62   Pulse: 96   Resp: 16   Temp: 97.7 °F (36.5 °C)   SpO2: 99%            Physical Exam   Constitutional: She is oriented to person, place, and time. She appears well-developed and well-nourished. No distress. HENT:   Head: Normocephalic and atraumatic. Eyes: EOM are normal. Pupils are equal, round, and reactive to light. Neck: Normal range of motion. Neck supple. Cardiovascular: Normal rate, regular rhythm and normal heart sounds. Pulmonary/Chest: No respiratory distress. She has no wheezes. She has no rales. Abdominal: Soft. Bowel sounds are normal. There is no tenderness. Genitourinary:   Genitourinary Comments: NE   Musculoskeletal: Normal range of motion. Neurological: She is alert and oriented to person, place, and time. Skin: Skin is warm and dry. 3 cm abscess formation on the left buttock inner fold. Does not approach the rectum. Very TTP. No DC. Psychiatric: She has a normal mood and affect. Nursing note and vitals reviewed. MDM  Number of Diagnoses or Management Options  Abscess of buttock, left:   Buttock pain:   Diagnosis management comments: MDM:  Plan to anesthetixze the lesion and adriana the abscess.   Lanny Robles NP  1:44 PM           Amount and/or Complexity of Data Reviewed  Clinical lab tests: ordered    Risk of Complications, Morbidity, and/or Mortality  Presenting problems: moderate  Diagnostic procedures: moderate  Management options: moderate    Patient Progress  Patient progress: stable        ED Course       I&D Abcess Simple  Date/Time: 6/25/2018 1:44 PM  Performed by: Per Clemente  Authorized by: Per Clemente     Consent:     Consent obtained:  Verbal, written and emergent situation    Consent given by:  Patient    Risks discussed:  Bleeding and infection    Alternatives discussed:  No treatment  Location:     Type:  Abscess    Size:  3 cm    Location: left buttock. Pre-procedure details:     Skin preparation:  Betadine  Anesthesia (see MAR for exact dosages): Anesthesia method:  Local infiltration    Local anesthetic:  Lidocaine 1% w/o epi (10 ml)  Procedure type:     Complexity:  Simple  Procedure details:     Needle aspiration: no      Incision types:  Single straight    Incision depth:  Dermal    Scalpel blade:  15    Wound management:  Irrigated with saline and probed and deloculated    Drainage:  Purulent    Drainage amount: Moderate    Wound treatment:  Wound left open    Packing materials:  1/4 in gauze    Amount 1/4\":  4 cm  Post-procedure details:     Patient tolerance of procedure: Tolerated well, no immediate complications      Diagnosis:   1. Abscess of buttock, left    2. Buttock pain          Disposition:   Discharged to Home      Follow-up Information     Follow up With Details Comments [address] Piotr Pham, DO Call to arrange follow up    [address]  [phone]            Patient's Medications   Start Taking    CEPHALEXIN (KEFLEX) 500 MG CAPSULE    Take 1 Cap by mouth four (4) times daily for 10 days. HYDROCODONE-ACETAMINOPHEN (NORCO) 5-325 MG PER TABLET    Take 1 Tab by mouth every four (4) hours as needed for Pain. Max Daily Amount: 6 Tabs. IBUPROFEN (MOTRIN) 800 MG TABLET    Take 1 Tab by mouth every eight (8) hours as needed for Pain for up to 7 days. TRIMETHOPRIM-SULFAMETHOXAZOLE (BACTRIM DS, SEPTRA DS) 160-800 MG PER TABLET    Take 1 Tab by mouth two (2) times a day. Continue Taking    ACETAMINOPHEN-CODEINE (TYLENOL-CODEINE #3) 300-30 MG PER TABLET    Take 1 Tab by mouth every four (4) hours as needed for Pain. Max Daily Amount: 6 Tabs. CELECOXIB (CELEBREX) 200 MG CAPSULE    Take 200 mg by mouth two (2) times a day. DULOXETINE (CYMBALTA) 20 MG CAPSULE    Take 20 mg by mouth daily. METHOCARBAMOL (ROBAXIN) 500 MG TABLET    Take  by mouth four (4) times daily.     RIZATRIPTAN (MAXALT) 10 MG TABLET    Take 10 mg by mouth once as needed for Migraine.  May repeat in 2 hours if needed   These Medications have changed    No medications on file   Stop Taking    No medications on file

## 2018-06-29 LAB
BACTERIA SPEC CULT: ABNORMAL
GRAM STN SPEC: ABNORMAL
SERVICE CMNT-IMP: ABNORMAL

## 2018-07-01 LAB
BACTERIA SPEC CULT: NORMAL
SERVICE CMNT-IMP: NORMAL

## 2018-07-06 NOTE — DISCHARGE INSTRUCTIONS
[employer] Activation    Thank you for requesting access to [employer]. Please follow the instructions below to securely access and download your online medical record. [employer] allows you to send messages to your doctor, view your test results, renew your prescriptions, schedule appointments, and more. How Do I Sign Up? 1. In your internet browser, go to [email or web address]  2. Click on the First Time User? Click Here link in the Sign In box. You will be redirect to the New Member Sign Up page. 3. Enter your [employer] Access Code exactly as it appears below. You will not need to use this code after youve completed the sign-up process. If you do not sign up before the expiration date, you must request a new code. [employer] Access Code: [email or web address]  Expires: 2018 11:04 AM (This is the date your [employer] access code will )    4. Enter the last four digits of your Social Security Number (xxxx) and Date of Birth (mm/dd/yyyy) as indicated and click Submit. You will be taken to the next sign-up page. 5. Create a [employer] ID. This will be your [employer] login ID and cannot be changed, so think of one that is secure and easy to remember. 6. Create a [employer] password. You can change your password at any time. 7. Enter your Password Reset Question and Answer. This can be used at a later time if you forget your password. 8. Enter your e-mail address. You will receive e-mail notification when new information is available in [address]. 9. Click Sign Up. You can now view and download portions of your medical record. 10. Click the Download Summary menu link to download a portable copy of your medical information. Additional Information    If you have questions, please visit the Frequently Asked Questions section of the [employer] website at [email or web address] Remember, [employer] is NOT to be used for urgent needs. For medical emergencies, dial 911. Keep wound clean and dry.   Change surface dressing at least daily. Follow up with primary care provider for packing tape change every 48 hours. Return to the ER at once for increased pain or bleeding. none

## 2019-03-17 ENCOUNTER — HOSPITAL ENCOUNTER (EMERGENCY)
Age: 34
Discharge: HOME | End: 2019-03-17
Attending: EMERGENCY MEDICINE
Payer: OTHER GOVERNMENT

## 2019-03-17 DIAGNOSIS — J02.9 PHARYNGITIS, UNSPECIFIED ETIOLOGY: Primary | ICD-10-CM

## 2019-03-17 LAB
BASOPHILS # BLD: 0 K/UL (ref 0–0.1)
BASOPHILS NFR BLD: 0 % (ref 0–2)
DIFFERENTIAL METHOD BLD: ABNORMAL
EOSINOPHIL # BLD: 0.1 K/UL (ref 0–0.4)
EOSINOPHIL NFR BLD: 1 % (ref 0–5)
ERYTHROCYTE [DISTWIDTH] IN BLOOD BY AUTOMATED COUNT: 12 % (ref 11.6–14.5)
HCT VFR BLD AUTO: 40.4 % (ref 35–45)
HETEROPH AB SER QL: NEGATIVE
HGB BLD-MCNC: 13.7 G/DL (ref 12–16)
LYMPHOCYTES # BLD: 1.3 K/UL (ref 0.9–3.6)
LYMPHOCYTES NFR BLD: 9 % (ref 21–52)
MCH RBC QN AUTO: 28 PG (ref 24–34)
MCHC RBC AUTO-ENTMCNC: 33.9 G/DL (ref 31–37)
MCV RBC AUTO: 82.4 FL (ref 74–97)
MONOCYTES # BLD: 1 K/UL (ref 0.05–1.2)
MONOCYTES NFR BLD: 6 % (ref 3–10)
NEUTS SEG # BLD: 13.4 K/UL (ref 1.8–8)
NEUTS SEG NFR BLD: 84 % (ref 40–73)
PLATELET # BLD AUTO: 274 K/UL (ref 135–420)
PMV BLD AUTO: 8.7 FL (ref 9.2–11.8)
RBC # BLD AUTO: 4.9 M/UL (ref 4.2–5.3)
WBC # BLD AUTO: 15.8 K/UL (ref 4.6–13.2)

## 2019-03-17 PROCEDURE — 96372 THER/PROPH/DIAG INJ SC/IM: CPT

## 2019-03-17 PROCEDURE — 86735 MUMPS ANTIBODY: CPT

## 2019-03-17 PROCEDURE — 87081 CULTURE SCREEN ONLY: CPT

## 2019-03-17 PROCEDURE — 74011250637 HC RX REV CODE- 250/637: Performed by: NURSE PRACTITIONER

## 2019-03-17 PROCEDURE — 99283 EMERGENCY DEPT VISIT LOW MDM: CPT

## 2019-03-17 PROCEDURE — 74011250636 HC RX REV CODE- 250/636: Performed by: NURSE PRACTITIONER

## 2019-03-17 PROCEDURE — 85025 COMPLETE CBC W/AUTO DIFF WBC: CPT

## 2019-03-17 PROCEDURE — 86308 HETEROPHILE ANTIBODY SCREEN: CPT

## 2019-03-17 RX ORDER — IBUPROFEN 400 MG/1
800 TABLET ORAL
Status: COMPLETED | OUTPATIENT
Start: 2019-03-17 | End: 2019-03-17

## 2019-03-17 RX ORDER — IBUPROFEN 800 MG/1
800 TABLET ORAL
Qty: 20 TAB | Refills: 0 | Status: SHIPPED | OUTPATIENT
Start: 2019-03-17 | End: 2019-03-24

## 2019-03-17 RX ORDER — ONDANSETRON 4 MG/1
4 TABLET, ORALLY DISINTEGRATING ORAL
Status: COMPLETED | OUTPATIENT
Start: 2019-03-17 | End: 2019-03-17

## 2019-03-17 RX ADMIN — PENICILLIN G BENZATHINE 1.2 MILLION UNITS: 1200000 INJECTION, SUSPENSION INTRAMUSCULAR at 13:32

## 2019-03-17 RX ADMIN — ONDANSETRON 4 MG: 4 TABLET, ORALLY DISINTEGRATING ORAL at 13:31

## 2019-03-17 RX ADMIN — IBUPROFEN 800 MG: 400 TABLET ORAL at 13:31

## 2019-03-17 NOTE — DISCHARGE INSTRUCTIONS
Patient Education        Sore Throat: Care Instructions  Your Care Instructions    Infection by bacteria or a virus causes most sore throats. Cigarette smoke, dry air, air pollution, allergies, and yelling can also cause a sore throat. Sore throats can be painful and annoying. Fortunately, most sore throats go away on their own. If you have a bacterial infection, your doctor may prescribe antibiotics. Follow-up care is a key part of your treatment and safety. Be sure to make and go to all appointments, and call your doctor if you are having problems. It's also a good idea to know your test results and keep a list of the medicines you take. How can you care for yourself at home? · If your doctor prescribed antibiotics, take them as directed. Do not stop taking them just because you feel better. You need to take the full course of antibiotics. · Gargle with warm salt water once an hour to help reduce swelling and relieve discomfort. Use 1 teaspoon of salt mixed in 1 cup of warm water. · Take an over-the-counter pain medicine, such as acetaminophen (Tylenol), ibuprofen (Advil, Motrin), or naproxen (Aleve). Read and follow all instructions on the label. · Be careful when taking over-the-counter cold or flu medicines and Tylenol at the same time. Many of these medicines have acetaminophen, which is Tylenol. Read the labels to make sure that you are not taking more than the recommended dose. Too much acetaminophen (Tylenol) can be harmful. · Drink plenty of fluids. Fluids may help soothe an irritated throat. Hot fluids, such as tea or soup, may help decrease throat pain. · Use over-the-counter throat lozenges to soothe pain. Regular cough drops or hard candy may also help. These should not be given to young children because of the risk of choking. · Do not smoke or allow others to smoke around you. If you need help quitting, talk to your doctor about stop-smoking programs and medicines.  These can increase your chances of quitting for good. · Use a vaporizer or humidifier to add moisture to your bedroom. Follow the directions for cleaning the machine. When should you call for help? Call your doctor now or seek immediate medical care if:    · You have new or worse trouble swallowing.     · Your sore throat gets much worse on one side.    Watch closely for changes in your health, and be sure to contact your doctor if you do not get better as expected. Where can you learn more? Go to http://arthur-erika.info/. Enter [phone] in the search box to learn more about \"Sore Throat: Care Instructions. \"  Current as of: 2018  Content Version: 11.9  © 3863-5994 [employer]. Care instructions adapted under license by [employer] (which disclaims liability or warranty for this information). If you have questions about a medical condition or this instruction, always ask your healthcare professional. [address] [address] any warranty or liability for your use of this information. [employer] Activation    Thank you for requesting access to [employer]. Please follow the instructions below to securely access and download your online medical record. [employer] allows you to send messages to your doctor, view your test results, renew your prescriptions, schedule appointments, and more. How Do I Sign Up? 1. In your internet browser, go to [email or web address]  2. Click on the First Time User? Click Here link in the Sign In box. You will be redirect to the New Member Sign Up page. 3. Enter your [employer] Access Code exactly as it appears below. You will not need to use this code after youve completed the sign-up process. If you do not sign up before the expiration date, you must request a new code. [employer] Access Code: [email or web address]  Expires: 2019 10:32 AM (This is the date your [employer] access code will )    4.  Enter the last four digits of your Social Security Number (xxxx) and Date of Birth (mm/dd/yyyy) as indicated and click Submit. You will be taken to the next sign-up page. 5. Create a [employer] ID. This will be your [employer] login ID and cannot be changed, so think of one that is secure and easy to remember. 6. Create a [employer] password. You can change your password at any time. 7. Enter your Password Reset Question and Answer. This can be used at a later time if you forget your password. 8. Enter your e-mail address. You will receive e-mail notification when new information is available in [address] 9. Click Sign Up. You can now view and download portions of your medical record. 10. Click the Download Summary menu link to download a portable copy of your medical information. Additional Information    If you have questions, please visit the Frequently Asked Questions section of the [employer] website at [email or web address] Remember, [employer] is NOT to be used for urgent needs. For medical emergencies, dial 911.

## 2019-03-17 NOTE — ED PROVIDER NOTES
Corinne Madden is a  34year old male who presents to the ED with a c/o sore throat. Pt states \"I think I have strep throat or Mono. \"    As that her throat has been hurting for a week. Denies cough and fever. Past Medical History:   Diagnosis Date    DJD (degenerative joint disease)     Ill-defined condition     DDD       Past Surgical History:   Procedure Laterality Date    HX HYSTERECTOMY           History reviewed. No pertinent family history. Social History     Socioeconomic History    Marital status:      Spouse name: Not on file    Number of children: Not on file    Years of education: Not on file    Highest education level: Not on file   Social Needs    Financial resource strain: Not on file    Food insecurity - worry: Not on file    Food insecurity - inability: Not on file    Transportation needs - medical: Not on file   [employer] needs - non-medical: Not on file   Occupational History    Not on file   Tobacco Use    Smoking status: Current Every Day Smoker     Packs/day: 1.00   Substance and Sexual Activity    Alcohol use: Yes     Comment: socially    Drug use: No    Sexual activity: Yes     Partners: Male   Other Topics Concern    Not on file   Social History Narrative    Not on file         ALLERGIES: Chantix [varenicline]; Neurontin [gabapentin]; and Topamax [topiramate]    Review of Systems   Constitutional: Negative. HENT: Positive for sore throat. Eyes: Negative. Respiratory: Negative. Cardiovascular: Negative. Gastrointestinal: Negative. Endocrine: Negative. Genitourinary: Negative. Musculoskeletal: Negative. Skin: Negative. Allergic/Immunologic: Negative. Neurological: Negative. Hematological: Negative. Psychiatric/Behavioral: Negative. There were no vitals filed for this visit. Physical Exam   Constitutional: She appears well-developed and well-nourished. No distress.    HENT:   Head: Normocephalic and atraumatic. Nose: Nose normal.   Mouth/Throat: Oropharynx is clear and moist.   Eyes: EOM are normal. Right eye exhibits no discharge. Left eye exhibits no discharge. No scleral icterus. Neck: Normal range of motion. Neck supple. No tracheal deviation present. Cardiovascular: Normal rate, regular rhythm and intact distal pulses. Pulmonary/Chest: Effort normal and breath sounds normal.   Abdominal: Soft. She exhibits no distension. Genitourinary:   Genitourinary Comments: NE     Musculoskeletal: She exhibits no deformity. Neurological: She is alert. Coordination normal.   Skin: Skin is warm and dry. NE.   Psychiatric: She has a normal mood and affect. Her behavior is normal.   Nursing note and vitals reviewed. MDM  Number of Diagnoses or Management Options  Diagnosis management comments: MDM:  Pt has no source of infection, but has nearly a 16 thousand white count. Have discussed options with the Pt/  Will give a shot of bicillin and call back of Mumps are positive. Froylan Reese NP  1:19 PM           Amount and/or Complexity of Data Reviewed  Clinical lab tests: ordered and reviewed    Risk of Complications, Morbidity, and/or Mortality  Presenting problems: minimal  Diagnostic procedures: minimal  Management options: minimal           Procedures    Diagnosis:   1. Pharyngitis, unspecified etiology          Disposition:   Discharged to Home     Follow-up Information     Follow up With Specialties Details Why Contact Brenda Huynh, DO [facility] Call to arrange follow up    [address] [phone]               Medication List      START taking these medications    ibuprofen 800 mg tablet  Commonly known as:  MOTRIN  Take 1 Tab by mouth every eight (8) hours as needed for Pain for up to 7 days.         ASK your doctor about these medications    acetaminophen-codeine 300-30 mg per tablet  Commonly known as: TYLENOL-CODEINE #3  Take 1 Tab by mouth every four (4) hours as needed for Pain. Max Daily Amount: 6 Tabs. celecoxib 200 mg capsule  Commonly known as:  CELEBREX     DULoxetine 20 mg capsule  Commonly known as:  CYMBALTA     HYDROcodone-acetaminophen 5-325 mg per tablet  Commonly known as:  NORCO  Take 1 Tab by mouth every four (4) hours as needed for Pain. Max Daily Amount: 6 Tabs. MAXALT 10 mg tablet  Generic drug:  rizatriptan     methocarbamol 500 mg tablet  Commonly known as:  ROBAXIN     trimethoprim-sulfamethoxazole 160-800 mg per tablet  Commonly known as:  BACTRIM DS, SEPTRA DS  Take 1 Tab by mouth two (2) times a day.            Where to Get Your Medications      Information about where to get these medications is not yet available    Ask your nurse or doctor about these medications  · ibuprofen 800 mg tablet

## 2019-03-19 LAB
B-HEM STREP THROAT QL CULT: NEGATIVE
BACTERIA SPEC CULT: NORMAL
MUV IGG SER IA-ACNC: 16.6 AU/ML
MUV IGM SER IA-ACNC: 1.37 AU (ref 0–0.79)
SERVICE CMNT-IMP: NORMAL

## 2019-03-23 NOTE — PROGRESS NOTES
Called the Pt's cell number and left a message to call back. Please advise the Pt that her Mumps came back positive and to follow up with her PCP. Thanks!   Humza Rodgers NP   11:20 PM

## 2022-03-08 ENCOUNTER — APPOINTMENT (RX ONLY)
Dept: URBAN - NONMETROPOLITAN AREA CLINIC 22 | Facility: CLINIC | Age: 37
Setting detail: DERMATOLOGY
End: 2022-03-08

## 2022-03-08 VITALS — HEIGHT: 63 IN | WEIGHT: 140 LBS

## 2022-03-08 DIAGNOSIS — L0293 CARBUNCLE AND FURUNCLE OF UNSPECIFIED SITE: ICD-10-CM

## 2022-03-08 DIAGNOSIS — L0292 CARBUNCLE AND FURUNCLE OF UNSPECIFIED SITE: ICD-10-CM

## 2022-03-08 DIAGNOSIS — L73.2 HIDRADENITIS SUPPURATIVA: ICD-10-CM | Status: INADEQUATELY CONTROLLED

## 2022-03-08 PROBLEM — L02.32 FURUNCLE OF BUTTOCK: Status: ACTIVE | Noted: 2022-03-08

## 2022-03-08 PROCEDURE — ? ADDITIONAL NOTES

## 2022-03-08 PROCEDURE — 10060 I&D ABSCESS SIMPLE/SINGLE: CPT

## 2022-03-08 PROCEDURE — 99204 OFFICE O/P NEW MOD 45 MIN: CPT | Mod: 25

## 2022-03-08 PROCEDURE — ? PRESCRIPTION MEDICATION MANAGEMENT

## 2022-03-08 PROCEDURE — ? COUNSELING

## 2022-03-08 PROCEDURE — ? INCISION AND DRAINAGE

## 2022-03-08 PROCEDURE — ? PRESCRIPTION

## 2022-03-08 PROCEDURE — ? ORDER TESTS

## 2022-03-08 RX ORDER — DOXYCYCLINE HYCLATE 100 MG/1
CAPSULE, GELATIN COATED ORAL
Qty: 14 | Refills: 0 | Status: ERX | COMMUNITY
Start: 2022-03-08

## 2022-03-08 RX ORDER — CLINDAMYCIN AND BENZOYL PEROXIDE GEL 1 %-5 %
KIT TOPICAL
Qty: 50 | Refills: 2 | Status: ERX | COMMUNITY
Start: 2022-03-08

## 2022-03-08 RX ADMIN — DOXYCYCLINE HYCLATE: 100 CAPSULE, GELATIN COATED ORAL at 00:00

## 2022-03-08 RX ADMIN — CLINDAMYCIN AND BENZOYL PEROXIDE GEL: KIT at 00:00

## 2022-03-08 ASSESSMENT — LOCATION ZONE DERM: LOCATION ZONE: LEG

## 2022-03-08 ASSESSMENT — LOCATION DETAILED DESCRIPTION DERM: LOCATION DETAILED: RIGHT GLUTEAL CREASE

## 2022-03-08 ASSESSMENT — LOCATION SIMPLE DESCRIPTION DERM: LOCATION SIMPLE: RIGHT GLUTEAL CREASE

## 2022-03-08 NOTE — PROCEDURE: MIPS QUALITY
Quality 130: Documentation Of Current Medications In The Medical Record: Current Medications Documented
Detail Level: Simple
Quality 431: Preventive Care And Screening: Unhealthy Alcohol Use - Screening: Patient not identified as an unhealthy alcohol user when screened for unhealthy alcohol use using a systematic screening method
Quality 226: Preventive Care And Screening: Tobacco Use: Screening And Cessation Intervention: Patient screened for tobacco use and is an ex/non-smoker

## 2022-03-08 NOTE — PROCEDURE: ORDER TESTS
Expected Date Of Service: 03/08/2022
Bill For Surgical Tray: no
Performing Laboratory: 0
Billing Type: Third-Party Bill

## 2022-03-08 NOTE — PROCEDURE: PRESCRIPTION MEDICATION MANAGEMENT
Detail Level: Zone
Initiate Treatment: doxycycline hyclate 100 mg capsule \\nQuantity: 14.0 Capsule\\nSig: Take 1 capsule bid with food
Render In Strict Bullet Format?: No
Initiate Treatment: clindamycin 1 %-benzoyl peroxide 5 % topical gel with pump \\nQuantity: 50.0 g  Days Supply: 30\\nSig: wash affected area bid for 1 week then

## 2022-03-08 NOTE — PROCEDURE: INCISION AND DRAINAGE
Detail Level: Detailed
Lesion Type: Cyst
Method: 11 blade
Curette: No
Anesthesia Type: 1% lidocaine with epinephrine
Anesthesia Volume In Cc: 1
Drainage Amount?: minimal
Drainage Type?: bloody and cyst-like
Wound Care: Petrolatum
Dressing: no dressing
Epidermal Sutures: 4-0 Ethilon
Epidermal Closure: simple interrupted
Suture Text: The incision was partially closed with
Preparation Text: The area was prepped in the usual clean fashion.
Curette Text (Optional): After the contents were expressed a curette was used to partially remove the cyst wall.
Consent was obtained and risks were reviewed including but not limited to delayed wound healing, infection, need for multiple I and D's, and pain.
Render Postcare In Note?: Yes
Post-Care Instructions: I reviewed with the patient in detail post-care instructions. Patient should keep wound clean and call the office should further or non-resolvinga redness, pain, swelling or worsening occur.